# Patient Record
Sex: MALE | Race: WHITE | NOT HISPANIC OR LATINO | Employment: FULL TIME | URBAN - NONMETROPOLITAN AREA
[De-identification: names, ages, dates, MRNs, and addresses within clinical notes are randomized per-mention and may not be internally consistent; named-entity substitution may affect disease eponyms.]

---

## 2017-06-07 ENCOUNTER — APPOINTMENT (OUTPATIENT)
Dept: LAB | Facility: HOSPITAL | Age: 57
End: 2017-06-07
Payer: COMMERCIAL

## 2017-06-07 ENCOUNTER — TRANSCRIBE ORDERS (OUTPATIENT)
Dept: ADMINISTRATIVE | Facility: HOSPITAL | Age: 57
End: 2017-06-07

## 2017-06-07 DIAGNOSIS — R73.9 HYPERGLYCEMIA: ICD-10-CM

## 2017-06-07 DIAGNOSIS — E78.5 HYPERLIPIDEMIA: ICD-10-CM

## 2017-06-07 DIAGNOSIS — I10 ESSENTIAL (PRIMARY) HYPERTENSION: ICD-10-CM

## 2017-06-07 LAB
ALBUMIN SERPL BCP-MCNC: 4.1 G/DL (ref 3.5–5)
ALP SERPL-CCNC: 41 U/L (ref 46–116)
ALT SERPL W P-5'-P-CCNC: 51 U/L (ref 12–78)
ANION GAP SERPL CALCULATED.3IONS-SCNC: 7 MMOL/L (ref 4–13)
AST SERPL W P-5'-P-CCNC: 22 U/L (ref 5–45)
BILIRUB SERPL-MCNC: 0.6 MG/DL (ref 0.2–1)
BUN SERPL-MCNC: 14 MG/DL (ref 5–25)
CALCIUM SERPL-MCNC: 9.1 MG/DL (ref 8.3–10.1)
CHLORIDE SERPL-SCNC: 103 MMOL/L (ref 100–108)
CHOLEST SERPL-MCNC: 217 MG/DL (ref 50–200)
CO2 SERPL-SCNC: 29 MMOL/L (ref 21–32)
CREAT SERPL-MCNC: 1.01 MG/DL (ref 0.6–1.3)
GFR SERPL CREATININE-BSD FRML MDRD: >60 ML/MIN/1.73SQ M
GLUCOSE P FAST SERPL-MCNC: 98 MG/DL (ref 65–99)
HDLC SERPL-MCNC: 50 MG/DL (ref 40–60)
LDLC SERPL DIRECT ASSAY-MCNC: 147 MG/DL (ref 0–100)
POTASSIUM SERPL-SCNC: 4.4 MMOL/L (ref 3.5–5.3)
PROT SERPL-MCNC: 6.8 G/DL (ref 6.4–8.2)
SODIUM SERPL-SCNC: 139 MMOL/L (ref 136–145)
TRIGL SERPL-MCNC: 173 MG/DL

## 2017-06-07 PROCEDURE — 36415 COLL VENOUS BLD VENIPUNCTURE: CPT

## 2017-06-07 PROCEDURE — 83721 ASSAY OF BLOOD LIPOPROTEIN: CPT

## 2017-06-07 PROCEDURE — 80053 COMPREHEN METABOLIC PANEL: CPT

## 2017-06-07 PROCEDURE — 80061 LIPID PANEL: CPT

## 2017-06-14 ENCOUNTER — ALLSCRIPTS OFFICE VISIT (OUTPATIENT)
Dept: OTHER | Facility: OTHER | Age: 57
End: 2017-06-14

## 2017-12-01 DIAGNOSIS — I10 ESSENTIAL (PRIMARY) HYPERTENSION: ICD-10-CM

## 2017-12-01 DIAGNOSIS — N40.0 ENLARGED PROSTATE WITHOUT LOWER URINARY TRACT SYMPTOMS (LUTS): ICD-10-CM

## 2017-12-01 DIAGNOSIS — R73.9 HYPERGLYCEMIA: ICD-10-CM

## 2017-12-01 DIAGNOSIS — Z12.5 ENCOUNTER FOR SCREENING FOR MALIGNANT NEOPLASM OF PROSTATE: ICD-10-CM

## 2017-12-01 DIAGNOSIS — E78.5 HYPERLIPIDEMIA: ICD-10-CM

## 2018-01-14 VITALS — HEART RATE: 72 BPM | SYSTOLIC BLOOD PRESSURE: 140 MMHG | DIASTOLIC BLOOD PRESSURE: 92 MMHG | RESPIRATION RATE: 14 BRPM

## 2018-01-17 ENCOUNTER — APPOINTMENT (OUTPATIENT)
Dept: LAB | Facility: CLINIC | Age: 58
End: 2018-01-17
Payer: COMMERCIAL

## 2018-01-17 ENCOUNTER — TRANSCRIBE ORDERS (OUTPATIENT)
Dept: LAB | Facility: CLINIC | Age: 58
End: 2018-01-17

## 2018-01-17 DIAGNOSIS — Z12.5 ENCOUNTER FOR SCREENING FOR MALIGNANT NEOPLASM OF PROSTATE: ICD-10-CM

## 2018-01-17 DIAGNOSIS — E78.5 HYPERLIPIDEMIA: ICD-10-CM

## 2018-01-17 DIAGNOSIS — R73.9 HYPERGLYCEMIA: ICD-10-CM

## 2018-01-17 DIAGNOSIS — I10 ESSENTIAL (PRIMARY) HYPERTENSION: ICD-10-CM

## 2018-01-17 DIAGNOSIS — N40.0 ENLARGED PROSTATE WITHOUT LOWER URINARY TRACT SYMPTOMS (LUTS): ICD-10-CM

## 2018-01-17 LAB
ALBUMIN SERPL BCP-MCNC: 4 G/DL (ref 3.5–5)
ALP SERPL-CCNC: 44 U/L (ref 46–116)
ALT SERPL W P-5'-P-CCNC: 44 U/L (ref 12–78)
ANION GAP SERPL CALCULATED.3IONS-SCNC: 6 MMOL/L (ref 4–13)
AST SERPL W P-5'-P-CCNC: 20 U/L (ref 5–45)
BASOPHILS # BLD AUTO: 0.04 THOUSANDS/ΜL (ref 0–0.1)
BASOPHILS NFR BLD AUTO: 1 % (ref 0–1)
BILIRUB SERPL-MCNC: 0.85 MG/DL (ref 0.2–1)
BUN SERPL-MCNC: 14 MG/DL (ref 5–25)
CALCIUM SERPL-MCNC: 8.9 MG/DL (ref 8.3–10.1)
CHLORIDE SERPL-SCNC: 106 MMOL/L (ref 100–108)
CHOLEST SERPL-MCNC: 160 MG/DL (ref 50–200)
CO2 SERPL-SCNC: 29 MMOL/L (ref 21–32)
CREAT SERPL-MCNC: 0.96 MG/DL (ref 0.6–1.3)
EOSINOPHIL # BLD AUTO: 0.13 THOUSAND/ΜL (ref 0–0.61)
EOSINOPHIL NFR BLD AUTO: 3 % (ref 0–6)
ERYTHROCYTE [DISTWIDTH] IN BLOOD BY AUTOMATED COUNT: 12.8 % (ref 11.6–15.1)
GFR SERPL CREATININE-BSD FRML MDRD: 86 ML/MIN/1.73SQ M
GLUCOSE P FAST SERPL-MCNC: 91 MG/DL (ref 65–99)
HCT VFR BLD AUTO: 42.9 % (ref 36.5–49.3)
HDLC SERPL-MCNC: 50 MG/DL (ref 40–60)
HGB BLD-MCNC: 15.2 G/DL (ref 12–17)
LDLC SERPL DIRECT ASSAY-MCNC: 101 MG/DL (ref 0–100)
LYMPHOCYTES # BLD AUTO: 1.68 THOUSANDS/ΜL (ref 0.6–4.47)
LYMPHOCYTES NFR BLD AUTO: 33 % (ref 14–44)
MCH RBC QN AUTO: 31.5 PG (ref 26.8–34.3)
MCHC RBC AUTO-ENTMCNC: 35.4 G/DL (ref 31.4–37.4)
MCV RBC AUTO: 89 FL (ref 82–98)
MONOCYTES # BLD AUTO: 0.54 THOUSAND/ΜL (ref 0.17–1.22)
MONOCYTES NFR BLD AUTO: 11 % (ref 4–12)
NEUTROPHILS # BLD AUTO: 2.68 THOUSANDS/ΜL (ref 1.85–7.62)
NEUTS SEG NFR BLD AUTO: 52 % (ref 43–75)
NRBC BLD AUTO-RTO: 0 /100 WBCS
PLATELET # BLD AUTO: 235 THOUSANDS/UL (ref 149–390)
PMV BLD AUTO: 9.8 FL (ref 8.9–12.7)
POTASSIUM SERPL-SCNC: 4.2 MMOL/L (ref 3.5–5.3)
PROT SERPL-MCNC: 7 G/DL (ref 6.4–8.2)
PSA SERPL-MCNC: 1.1 NG/ML (ref 0–4)
RBC # BLD AUTO: 4.83 MILLION/UL (ref 3.88–5.62)
SODIUM SERPL-SCNC: 141 MMOL/L (ref 136–145)
TRIGL SERPL-MCNC: 126 MG/DL
WBC # BLD AUTO: 5.08 THOUSAND/UL (ref 4.31–10.16)

## 2018-01-17 PROCEDURE — 83721 ASSAY OF BLOOD LIPOPROTEIN: CPT

## 2018-01-17 PROCEDURE — 85025 COMPLETE CBC W/AUTO DIFF WBC: CPT

## 2018-01-17 PROCEDURE — 80053 COMPREHEN METABOLIC PANEL: CPT

## 2018-01-17 PROCEDURE — G0103 PSA SCREENING: HCPCS

## 2018-01-17 PROCEDURE — 36415 COLL VENOUS BLD VENIPUNCTURE: CPT

## 2018-01-17 PROCEDURE — 80061 LIPID PANEL: CPT

## 2018-01-22 ENCOUNTER — GENERIC CONVERSION - ENCOUNTER (OUTPATIENT)
Dept: OTHER | Facility: OTHER | Age: 58
End: 2018-01-22

## 2018-01-22 DIAGNOSIS — I65.29 OCCLUSION AND STENOSIS OF UNSPECIFIED CAROTID ARTERY: ICD-10-CM

## 2018-03-08 ENCOUNTER — TRANSCRIBE ORDERS (OUTPATIENT)
Dept: ADMINISTRATIVE | Facility: HOSPITAL | Age: 58
End: 2018-03-08

## 2018-03-09 ENCOUNTER — HOSPITAL ENCOUNTER (OUTPATIENT)
Dept: ULTRASOUND IMAGING | Facility: HOSPITAL | Age: 58
Discharge: HOME | End: 2018-03-09
Payer: COMMERCIAL

## 2018-03-09 ENCOUNTER — HOSPITAL ENCOUNTER (OUTPATIENT)
Dept: NON INVASIVE DIAGNOSTICS | Facility: HOSPITAL | Age: 58
Discharge: HOME | End: 2018-03-09
Payer: COMMERCIAL

## 2018-03-09 DIAGNOSIS — I65.29 OCCLUSION AND STENOSIS OF UNSPECIFIED CAROTID ARTERY: ICD-10-CM

## 2018-03-09 PROCEDURE — 93016 CV STRESS TEST SUPVJ ONLY: CPT | Performed by: INTERNAL MEDICINE

## 2018-03-09 PROCEDURE — 93880 EXTRACRANIAL BILAT STUDY: CPT | Performed by: SURGERY

## 2018-03-09 PROCEDURE — 93018 CV STRESS TEST I&R ONLY: CPT | Performed by: INTERNAL MEDICINE

## 2018-03-09 PROCEDURE — 93017 CV STRESS TEST TRACING ONLY: CPT

## 2018-03-09 PROCEDURE — 93880 EXTRACRANIAL BILAT STUDY: CPT

## 2018-03-11 ENCOUNTER — DOCUMENTATION (OUTPATIENT)
Dept: INTERNAL MEDICINE CLINIC | Facility: CLINIC | Age: 58
End: 2018-03-11

## 2018-03-11 NOTE — PROGRESS NOTES
Please call the patient regarding his testing-stress test normal   Carotid Doppler shows only mild hardening of the arteries-unchanged from his prior carotid Doppler-[facility]

## 2018-03-11 NOTE — PROGRESS NOTES
Stress test normal   Carotid Doppler shows less than 50% stenosis bilaterally-unchanged from prior carotid Doppler-rachael chart next visit patient had stress test and carotid Doppler in March of 2018

## 2018-03-12 ENCOUNTER — TELEPHONE (OUTPATIENT)
Dept: INTERNAL MEDICINE CLINIC | Facility: CLINIC | Age: 58
End: 2018-03-12

## 2018-03-12 NOTE — TELEPHONE ENCOUNTER
PER , I LEFT 'S NAME AND PHONE NUMBER ON HIS MACHINE SO THAT HE CAN CALL TO SCHEDULE APPT   I TOLD PT HE CAN CALL US IF HE HAS ANY QUESTIONS OR TROUBLE MAKING THE APPT

## 2018-03-12 NOTE — TELEPHONE ENCOUNTER
----- Message from Monica Lipscomb MD sent at 3/11/2018  8:46 AM EDT -----  Please call the patient regarding his testing-stress test normal   Carotid Doppler shows only mild hardening of the arteries-unchanged from his prior carotid Doppler-[facility]

## 2018-03-13 LAB
CHEST PAIN STATEMENT: NORMAL
MAX DIASTOLIC BP: 84 MMHG
MAX HEART RATE: 169 BPM
MAX PREDICTED HEART RATE: 160 BPM
MAX. SYSTOLIC BP: 170 MMHG
PROTOCOL NAME: NORMAL
REASON FOR TERMINATION: NORMAL
TARGET HR FORMULA: NORMAL
TEST INDICATION: NORMAL
TIME IN EXERCISE PHASE: NORMAL

## 2018-07-01 DIAGNOSIS — I10 ESSENTIAL (PRIMARY) HYPERTENSION: ICD-10-CM

## 2018-07-01 DIAGNOSIS — E78.5 HYPERLIPIDEMIA: ICD-10-CM

## 2018-07-20 ENCOUNTER — APPOINTMENT (OUTPATIENT)
Dept: LAB | Facility: HOSPITAL | Age: 58
End: 2018-07-20
Payer: COMMERCIAL

## 2018-07-20 DIAGNOSIS — I10 ESSENTIAL (PRIMARY) HYPERTENSION: ICD-10-CM

## 2018-07-20 DIAGNOSIS — E78.5 HYPERLIPIDEMIA: ICD-10-CM

## 2018-07-20 LAB
ALBUMIN SERPL BCP-MCNC: 4.1 G/DL (ref 3.5–5)
ALP SERPL-CCNC: 54 U/L (ref 46–116)
ALT SERPL W P-5'-P-CCNC: 58 U/L (ref 12–78)
ANION GAP SERPL CALCULATED.3IONS-SCNC: 9 MMOL/L (ref 4–13)
AST SERPL W P-5'-P-CCNC: 37 U/L (ref 5–45)
BILIRUB SERPL-MCNC: 1 MG/DL (ref 0.2–1)
BUN SERPL-MCNC: 17 MG/DL (ref 5–25)
CALCIUM SERPL-MCNC: 9.1 MG/DL (ref 8.3–10.1)
CHLORIDE SERPL-SCNC: 103 MMOL/L (ref 100–108)
CHOLEST SERPL-MCNC: 160 MG/DL (ref 50–200)
CO2 SERPL-SCNC: 26 MMOL/L (ref 21–32)
CREAT SERPL-MCNC: 0.97 MG/DL (ref 0.6–1.3)
GFR SERPL CREATININE-BSD FRML MDRD: 84 ML/MIN/1.73SQ M
GLUCOSE P FAST SERPL-MCNC: 94 MG/DL (ref 65–99)
HDLC SERPL-MCNC: 47 MG/DL (ref 40–60)
LDLC SERPL DIRECT ASSAY-MCNC: 97 MG/DL (ref 0–100)
POTASSIUM SERPL-SCNC: 4 MMOL/L (ref 3.5–5.3)
PROT SERPL-MCNC: 6.8 G/DL (ref 6.4–8.2)
SODIUM SERPL-SCNC: 138 MMOL/L (ref 136–145)
TRIGL SERPL-MCNC: 135 MG/DL

## 2018-07-20 PROCEDURE — 83721 ASSAY OF BLOOD LIPOPROTEIN: CPT

## 2018-07-20 PROCEDURE — 80053 COMPREHEN METABOLIC PANEL: CPT

## 2018-07-20 PROCEDURE — 80061 LIPID PANEL: CPT

## 2018-07-20 PROCEDURE — 36415 COLL VENOUS BLD VENIPUNCTURE: CPT

## 2018-07-24 RX ORDER — ROSUVASTATIN CALCIUM 5 MG/1
1 TABLET, COATED ORAL DAILY
COMMUNITY
Start: 2017-06-14 | End: 2018-07-25 | Stop reason: SDUPTHER

## 2018-07-24 RX ORDER — SODIUM PICOSULFATE, MAGNESIUM OXIDE, AND ANHYDROUS CITRIC ACID 10; 3.5; 12 MG/160ML; G/160ML; G/160ML
LIQUID ORAL
COMMUNITY
Start: 2018-06-02 | End: 2019-01-31 | Stop reason: ALTCHOICE

## 2018-07-24 RX ORDER — CHLORAL HYDRATE 500 MG
1 CAPSULE ORAL 2 TIMES DAILY
COMMUNITY

## 2018-07-24 RX ORDER — ACETAMINOPHEN AND CODEINE PHOSPHATE 300; 30 MG/1; MG/1
TABLET ORAL
COMMUNITY
Start: 2014-08-12 | End: 2022-04-27

## 2018-07-24 RX ORDER — SIMVASTATIN 20 MG
1 TABLET ORAL DAILY
COMMUNITY
Start: 2014-03-26 | End: 2019-02-01

## 2018-07-24 RX ORDER — ENALAPRIL MALEATE 10 MG/1
1 TABLET ORAL 2 TIMES DAILY
COMMUNITY
Start: 2015-05-11 | End: 2018-07-25 | Stop reason: SDUPTHER

## 2018-07-24 RX ORDER — AMLODIPINE BESYLATE 5 MG/1
1 TABLET ORAL DAILY
COMMUNITY
Start: 2017-06-14 | End: 2018-07-25 | Stop reason: SDUPTHER

## 2018-07-25 ENCOUNTER — OFFICE VISIT (OUTPATIENT)
Dept: INTERNAL MEDICINE CLINIC | Facility: CLINIC | Age: 58
End: 2018-07-25
Payer: COMMERCIAL

## 2018-07-25 VITALS
DIASTOLIC BLOOD PRESSURE: 78 MMHG | SYSTOLIC BLOOD PRESSURE: 120 MMHG | BODY MASS INDEX: 29.07 KG/M2 | HEART RATE: 72 BPM | WEIGHT: 202.6 LBS | RESPIRATION RATE: 14 BRPM

## 2018-07-25 DIAGNOSIS — E78.5 HYPERLIPIDEMIA, UNSPECIFIED HYPERLIPIDEMIA TYPE: ICD-10-CM

## 2018-07-25 DIAGNOSIS — I10 HYPERTENSION, UNSPECIFIED TYPE: ICD-10-CM

## 2018-07-25 DIAGNOSIS — R97.20 ELEVATED PSA: Primary | ICD-10-CM

## 2018-07-25 DIAGNOSIS — R97.20 ELEVATED PSA: ICD-10-CM

## 2018-07-25 PROCEDURE — 3074F SYST BP LT 130 MM HG: CPT | Performed by: INTERNAL MEDICINE

## 2018-07-25 PROCEDURE — 3078F DIAST BP <80 MM HG: CPT | Performed by: INTERNAL MEDICINE

## 2018-07-25 PROCEDURE — 99214 OFFICE O/P EST MOD 30 MIN: CPT | Performed by: INTERNAL MEDICINE

## 2018-07-25 RX ORDER — ROSUVASTATIN CALCIUM 5 MG/1
5 TABLET, COATED ORAL DAILY
Qty: 90 TABLET | Refills: 3 | Status: SHIPPED | OUTPATIENT
Start: 2018-07-25 | End: 2019-09-23 | Stop reason: SDUPTHER

## 2018-07-25 RX ORDER — AMLODIPINE BESYLATE 5 MG/1
5 TABLET ORAL DAILY
Qty: 90 TABLET | Refills: 3 | Status: SHIPPED | OUTPATIENT
Start: 2018-07-25 | End: 2019-09-23 | Stop reason: SDUPTHER

## 2018-07-25 RX ORDER — ENALAPRIL MALEATE 10 MG/1
10 TABLET ORAL 2 TIMES DAILY
Qty: 180 TABLET | Refills: 3 | Status: SHIPPED | OUTPATIENT
Start: 2018-07-25 | End: 2019-09-23 | Stop reason: SDUPTHER

## 2018-07-25 NOTE — PROGRESS NOTES
Assessment/Plan:  1  Subclinical carotid stenosis-less than 50% bilaterally-carotid Doppler unchanged over 6 year span and last done in the year 2018  2  Hypertension-adequate control 1 amlodipine plus now pro  3  Elevated PSA-previously running around 1 2-then abruptly climbed to 3 5  Repeated since had a normal in several occasions  Most recent 1 1  For repeat prior to next visit  4  Strong family history coronary artery disease-recent stress test normal  5  History difficulty swallowing-rule out Schatzki's ring-not an issue for months  6  Hyperlipidemia-elevated liver enzymes with Lipitor  Pravastatin affective  Without adequate control on simvastatin  Much improved on generic Crestor      MEDICAL REGIMEN:      Crestor 5 milligrams daily, 2 grams of fish oil daily, baby aspirin daily, enalapril 10 milligrams b i d  amlodipine 5 milligrams daily    Appointment in 6 months with prior chemistry profile, cholesterol profile  No problem-specific Assessment & Plan notes found for this encounter  Diagnoses and all orders for this visit:    Elevated PSA  -     acetaminophen-codeine (TYLENOL with CODEINE #3) 300-30 MG per tablet; Take by mouth  -     amLODIPine (NORVASC) 5 mg tablet; Take 1 tablet by mouth daily  -     aspirin 81 MG tablet; Take by mouth  -     enalapril (VASOTEC) 10 mg tablet; Take 1 tablet by mouth 2 (two) times a day  -     Omega-3 Fatty Acids (FISH OIL) 1,000 mg; Take 1 capsule by mouth daily  -     rosuvastatin (CRESTOR) 5 mg tablet; Take 1 tablet by mouth daily  -     simvastatin (ZOCOR) 20 mg tablet; Take 1 tablet by mouth daily  -     CLENPIQ 10-3 5-12 MG-GM -GM/160ML SOLN;   -     Comprehensive metabolic panel; Future  -     Cholesterol, total; Future  -     Triglycerides; Future  -     HDL cholesterol; Future  -     LDL cholesterol, direct; Future  -     PSA Total, Diagnostic;  Future    Hyperlipidemia, unspecified hyperlipidemia type  -     acetaminophen-codeine (TYLENOL with CODEINE #3) 300-30 MG per tablet; Take by mouth  -     amLODIPine (NORVASC) 5 mg tablet; Take 1 tablet by mouth daily  -     aspirin 81 MG tablet; Take by mouth  -     enalapril (VASOTEC) 10 mg tablet; Take 1 tablet by mouth 2 (two) times a day  -     Omega-3 Fatty Acids (FISH OIL) 1,000 mg; Take 1 capsule by mouth daily  -     rosuvastatin (CRESTOR) 5 mg tablet; Take 1 tablet by mouth daily  -     simvastatin (ZOCOR) 20 mg tablet; Take 1 tablet by mouth daily  -     CLENPIQ 10-3 5-12 MG-GM -GM/160ML SOLN;   -     Comprehensive metabolic panel; Future  -     Cholesterol, total; Future  -     Triglycerides; Future  -     HDL cholesterol; Future  -     LDL cholesterol, direct; Future  -     PSA Total, Diagnostic; Future    Hypertension, unspecified type  -     acetaminophen-codeine (TYLENOL with CODEINE #3) 300-30 MG per tablet; Take by mouth  -     amLODIPine (NORVASC) 5 mg tablet; Take 1 tablet by mouth daily  -     aspirin 81 MG tablet; Take by mouth  -     enalapril (VASOTEC) 10 mg tablet; Take 1 tablet by mouth 2 (two) times a day  -     Omega-3 Fatty Acids (FISH OIL) 1,000 mg; Take 1 capsule by mouth daily  -     rosuvastatin (CRESTOR) 5 mg tablet; Take 1 tablet by mouth daily  -     simvastatin (ZOCOR) 20 mg tablet; Take 1 tablet by mouth daily  -     CLENPIQ 10-3 5-12 MG-GM -GM/160ML SOLN;   -     Comprehensive metabolic panel; Future  -     Cholesterol, total; Future  -     Triglycerides; Future  -     HDL cholesterol; Future  -     LDL cholesterol, direct; Future  -     PSA Total, Diagnostic; Future          Subjective:      Patient ID: Karsten Glez is a 59 y o  male  He is overall relatively stable  Laboratory testing done prior to this visit shows LDL 97 HDL 47 triglycerides 135 cholesterol 160 chemistry profile normal other than a creatinine of 0 97  He has known hypertension  BP adequately controlled on current regimen with goal BP under 130/80  Avoiding salt and decongestants    Denies hematemesis pounding of his heart sweats and headache  Remains on a statin  Understands the difference between status associated myalgias and arthralgias from degenerative arthritis  He underwent vascular screen does last visit  Exercise stress test normal   Carotid Doppler shows mild disease bilaterally specifically less than 50%  Plaque was heterogeneous bilaterally  It was unchanged from the year 2013  We reviewed contributing risk factors to vascular disease including hypertension and hyperlipidemia  He is not a smoker and does not have diabetes  This patient denies any systemic symptoms  Specifically there has been no evidence of fever, night sweats, significant weight loss or significant decrease in appetite  He avoids excess nonsteroidals  He works long hours -50 hours per week  He denies any weakness of 1 arm and leg compared to the other  Denies any numbness of 1 arm and leg compared to the other  He also completed a colonoscopy which was done showing a normal study with repeat recommended in 10 years        The following portions of the patient's history were reviewed and updated as appropriate: current medications, past family history, past medical history, past social history, past surgical history and problem list     Review of Systems   Constitutional: Negative  Respiratory: Negative  Cardiovascular: Negative  Gastrointestinal: Negative  Endocrine: Negative  Genitourinary: Negative  Musculoskeletal: Negative  Neurological: Negative  Hematological: Negative  Psychiatric/Behavioral: Negative  Objective:      /78   Pulse 72   Resp 14   Wt 91 9 kg (202 lb 9 6 oz)   BMI 29 07 kg/m²          Physical Exam   Constitutional: He is oriented to person, place, and time  He appears well-developed and well-nourished  No distress  HENT:   Head: Normocephalic and atraumatic     Right Ear: External ear normal    Left Ear: External ear normal  Nose: Nose normal    Mouth/Throat: Oropharynx is clear and moist  No oropharyngeal exudate  Eyes: Conjunctivae and EOM are normal  Pupils are equal, round, and reactive to light  Right eye exhibits no discharge  Left eye exhibits no discharge  No scleral icterus  Neck: No JVD present  No tracheal deviation present  No thyromegaly present  Cardiovascular: Normal rate, regular rhythm, normal heart sounds and intact distal pulses  Exam reveals no gallop and no friction rub  No murmur heard  Pulmonary/Chest: Effort normal and breath sounds normal  No stridor  No respiratory distress  He has no wheezes  He exhibits no tenderness  Abdominal: Bowel sounds are normal  He exhibits no distension and no mass  There is no tenderness  There is no rebound and no guarding  Genitourinary: Prostate normal  Rectal exam shows guaiac negative stool  Musculoskeletal: Normal range of motion  He exhibits no edema, tenderness or deformity  Lymphadenopathy:     He has no cervical adenopathy  Neurological: He is alert and oriented to person, place, and time  He has normal reflexes  No cranial nerve deficit  He exhibits normal muscle tone  Coordination normal    Skin: Skin is warm and dry  No rash noted  He is not diaphoretic  No erythema  No pallor  Psychiatric: He has a normal mood and affect   His behavior is normal  Judgment and thought content normal

## 2019-01-17 ENCOUNTER — APPOINTMENT (OUTPATIENT)
Dept: LAB | Facility: HOSPITAL | Age: 59
End: 2019-01-17
Payer: COMMERCIAL

## 2019-01-17 DIAGNOSIS — E78.5 HYPERLIPIDEMIA, UNSPECIFIED HYPERLIPIDEMIA TYPE: ICD-10-CM

## 2019-01-17 DIAGNOSIS — I10 HYPERTENSION, UNSPECIFIED TYPE: ICD-10-CM

## 2019-01-17 DIAGNOSIS — R97.20 ELEVATED PSA: ICD-10-CM

## 2019-01-17 LAB
ALBUMIN SERPL BCP-MCNC: 4.1 G/DL (ref 3.5–5)
ALP SERPL-CCNC: 52 U/L (ref 46–116)
ALT SERPL W P-5'-P-CCNC: 46 U/L (ref 12–78)
ANION GAP SERPL CALCULATED.3IONS-SCNC: 8 MMOL/L (ref 4–13)
AST SERPL W P-5'-P-CCNC: 20 U/L (ref 5–45)
BILIRUB SERPL-MCNC: 0.8 MG/DL (ref 0.2–1)
BUN SERPL-MCNC: 15 MG/DL (ref 5–25)
CALCIUM SERPL-MCNC: 9.3 MG/DL (ref 8.3–10.1)
CHLORIDE SERPL-SCNC: 103 MMOL/L (ref 100–108)
CHOLEST SERPL-MCNC: 179 MG/DL (ref 50–200)
CO2 SERPL-SCNC: 29 MMOL/L (ref 21–32)
CREAT SERPL-MCNC: 1.07 MG/DL (ref 0.6–1.3)
GFR SERPL CREATININE-BSD FRML MDRD: 75 ML/MIN/1.73SQ M
GLUCOSE P FAST SERPL-MCNC: 98 MG/DL (ref 65–99)
HDLC SERPL-MCNC: 51 MG/DL (ref 40–60)
LDLC SERPL DIRECT ASSAY-MCNC: 113 MG/DL (ref 0–100)
POTASSIUM SERPL-SCNC: 4.4 MMOL/L (ref 3.5–5.3)
PROT SERPL-MCNC: 7.2 G/DL (ref 6.4–8.2)
PSA SERPL-MCNC: 6.5 NG/ML (ref 0–4)
SODIUM SERPL-SCNC: 140 MMOL/L (ref 136–145)
TRIGL SERPL-MCNC: 144 MG/DL

## 2019-01-17 PROCEDURE — 83721 ASSAY OF BLOOD LIPOPROTEIN: CPT

## 2019-01-17 PROCEDURE — 80053 COMPREHEN METABOLIC PANEL: CPT

## 2019-01-17 PROCEDURE — 80061 LIPID PANEL: CPT

## 2019-01-17 PROCEDURE — 84153 ASSAY OF PSA TOTAL: CPT

## 2019-01-17 PROCEDURE — 36415 COLL VENOUS BLD VENIPUNCTURE: CPT

## 2019-01-23 ENCOUNTER — OFFICE VISIT (OUTPATIENT)
Dept: INTERNAL MEDICINE CLINIC | Facility: CLINIC | Age: 59
End: 2019-01-23
Payer: COMMERCIAL

## 2019-01-23 VITALS
HEIGHT: 70 IN | WEIGHT: 200.6 LBS | SYSTOLIC BLOOD PRESSURE: 130 MMHG | HEART RATE: 72 BPM | BODY MASS INDEX: 28.72 KG/M2 | DIASTOLIC BLOOD PRESSURE: 84 MMHG | RESPIRATION RATE: 14 BRPM

## 2019-01-23 DIAGNOSIS — E78.5 HYPERLIPIDEMIA, UNSPECIFIED HYPERLIPIDEMIA TYPE: Primary | ICD-10-CM

## 2019-01-23 DIAGNOSIS — E78.2 MIXED HYPERLIPIDEMIA: ICD-10-CM

## 2019-01-23 DIAGNOSIS — I10 HYPERTENSION, UNSPECIFIED TYPE: ICD-10-CM

## 2019-01-23 DIAGNOSIS — R97.20 ELEVATED PSA: ICD-10-CM

## 2019-01-23 DIAGNOSIS — R13.10 DYSPHAGIA, UNSPECIFIED TYPE: ICD-10-CM

## 2019-01-23 PROCEDURE — 99214 OFFICE O/P EST MOD 30 MIN: CPT | Performed by: INTERNAL MEDICINE

## 2019-01-23 NOTE — PROGRESS NOTES
Assessment/Plan:  1  Elevated PSA-previously running around 1-2 then abruptly climbed to 3 5-repeated since the normal in several occasions  Over the last year was 1 1  Now climbed to 6 5  Exam shows enlarged gland  Rule out prostate CA-referred to Urology  2  Solid food dysphagia-rule out Schatzki's ring-referred to GI for EGD  3  Subclinical carotid stenosis-carotid Doppler unchanged over 6 year span last year done in the year 2018  4  Hypertension-adequate control on amlodipine at current dose of ACE-inhibitor  5  Strong family history CAD-recent stress test normal  6  Hyperlipidemia-had elevated liver enzymes with Lipitor  Pravastatin ineffective  Without adequate control on simvastatin  Much improved on generic Crestor      MEDICAL REGIMEN:      Crestor 5 milligrams daily, fish oil 2 grams daily, baby aspirin daily, enalapril 10 milligrams b i d , amlodipine 5 milligrams daily    Scheduled for appointment with GI and Urology  Appointment in 6 months with prior chemistry profile, cholesterol profile  No problem-specific Assessment & Plan notes found for this encounter  Diagnoses and all orders for this visit:    Hyperlipidemia, unspecified hyperlipidemia type  -     Comprehensive metabolic panel; Future  -     Cholesterol, total; Future  -     HDL cholesterol; Future  -     LDL cholesterol, direct; Future  -     Triglycerides; Future    Hypertension, unspecified type  -     Comprehensive metabolic panel; Future  -     Cholesterol, total; Future  -     HDL cholesterol; Future  -     LDL cholesterol, direct; Future  -     Triglycerides; Future    Elevated PSA    Dysphagia, unspecified type    Mixed hyperlipidemia          Subjective:      Patient ID: Radha Abraham is a 59 y o  male  2 new issues  He talked about solid food dysphagia  This is becoming more of an issue  He denies liquid food dysphagia  He denies frequent reflux with only had 1 episode over the last few weeks    This patient denies any systemic symptoms  Specifically there has been no evidence of fever, night sweats, significant weight loss or significant decrease in appetite  This was an issue in the past but had improved  I recommended repeat evaluation by GI and he will likely need an endoscopy  He also has an elevated PSA  This it BP elevated in the past to 3 5  Then had normalized  Was 1 1 previously  Now climbed to 6 5  Referral made to Urology  He denies nocturia  He denies slowing of his stream   He denies any hematuria  He has known hypertension  Avoiding salt and decongestants  Denies hematemesis pounding of heart sweats and headache  He remains on a statin  He understands the difference between status associated myalgias and arthralgias from degenerative arthritis  Other labs done prior to this visit show an  HDL 51 triglycerides 144 Chem profile normal with a creatinine 1 07            The following portions of the patient's history were reviewed and updated as appropriate: allergies, current medications, past family history, past medical history, past social history, past surgical history and problem list     Review of Systems   Constitutional: Negative  Respiratory: Negative  Cardiovascular: Negative  Gastrointestinal: Negative  Solid food dysphagia   Endocrine: Negative  Genitourinary: Negative  Musculoskeletal: Negative  Neurological: Negative  Hematological: Negative  Psychiatric/Behavioral: Negative  Objective:      Ht 5' 10" (1 778 m)   Wt 91 kg (200 lb 9 6 oz)   BMI 28 78 kg/m²          Physical Exam   Constitutional: He is oriented to person, place, and time  He appears well-developed and well-nourished  No distress  HENT:   Head: Normocephalic and atraumatic  Right Ear: External ear normal    Left Ear: External ear normal    Nose: Nose normal    Mouth/Throat: Oropharynx is clear and moist  No oropharyngeal exudate     Eyes: Pupils are equal, round, and reactive to light  Conjunctivae and EOM are normal  Right eye exhibits no discharge  Left eye exhibits no discharge  No scleral icterus  Neck: No JVD present  No tracheal deviation present  No thyromegaly present  Cardiovascular: Normal rate, regular rhythm, normal heart sounds and intact distal pulses  Exam reveals no gallop and no friction rub  No murmur heard  Pulmonary/Chest: Effort normal and breath sounds normal  No stridor  No respiratory distress  He has no wheezes  He exhibits no tenderness  Abdominal: Bowel sounds are normal  He exhibits no distension and no mass  There is no tenderness  There is no rebound and no guarding  Genitourinary: Rectum normal and penis normal  Rectal exam shows guaiac negative stool  Genitourinary Comments: Enlarged prostate without nodules   Musculoskeletal: Normal range of motion  He exhibits no edema, tenderness or deformity  Lymphadenopathy:     He has no cervical adenopathy  Neurological: He is alert and oriented to person, place, and time  He has normal reflexes  No cranial nerve deficit  He exhibits normal muscle tone  Coordination normal    Skin: Skin is warm and dry  No rash noted  He is not diaphoretic  No erythema  No pallor  Psychiatric: He has a normal mood and affect   His behavior is normal  Judgment and thought content normal

## 2019-01-24 DIAGNOSIS — R13.10 DYSPHAGIA, UNSPECIFIED TYPE: ICD-10-CM

## 2019-01-24 DIAGNOSIS — R97.20 ELEVATED PSA: Primary | ICD-10-CM

## 2019-01-24 NOTE — PROGRESS NOTES
PER DR, PT NEEDS TO SEE BOTH DR Katty Olea AND A PHYSICIAN AT UROLOGY    PT IS AWARE THAT HE'LL HEAR FROM BOTH OFFICES AND IF HE DOESN'T WITHIN THE NEXT SEVERAL DAYS, HE SHOULD CALL US TO LET US KNOW SO WE CAN CALL THEIR OFFICES

## 2019-01-31 ENCOUNTER — OFFICE VISIT (OUTPATIENT)
Dept: UROLOGY | Facility: CLINIC | Age: 59
End: 2019-01-31
Payer: COMMERCIAL

## 2019-01-31 VITALS
HEIGHT: 70 IN | BODY MASS INDEX: 28.49 KG/M2 | RESPIRATION RATE: 20 BRPM | DIASTOLIC BLOOD PRESSURE: 90 MMHG | WEIGHT: 199 LBS | HEART RATE: 72 BPM | SYSTOLIC BLOOD PRESSURE: 160 MMHG

## 2019-01-31 DIAGNOSIS — R97.20 ELEVATED PSA: ICD-10-CM

## 2019-01-31 PROCEDURE — 99244 OFF/OP CNSLTJ NEW/EST MOD 40: CPT | Performed by: UROLOGY

## 2019-01-31 NOTE — PROGRESS NOTES
UROLOGY NEW CONSULT NOTE     CHIEF COMPLAINT   Marian Tejeda is a 59 y o  male with a complaint of   Chief Complaint   Patient presents with    Elevated PSA       History of Present Illness:     59 y o  male   Who has undergone recent PSA testing that was elevated from prior baseline  Has had routine digital rectal exams that were normal   Denies prostate cancer history in the family  Presents to discuss  Somewhat anxious about the elevated PSA  Lab Results   Component Value Date    PSA 6 5 (H) 01/17/2019    PSA 1 1 01/17/2018    PSA 1 2 12/09/2016       Past Medical History:     Past Medical History:   Diagnosis Date    Elevated PSA     Hyperlipidemia     Hypertension        PAST SURGICAL HISTORY:   History reviewed  No pertinent surgical history  CURRENT MEDICATIONS:     Current Outpatient Prescriptions   Medication Sig Dispense Refill    amLODIPine (NORVASC) 5 mg tablet Take 1 tablet (5 mg total) by mouth daily 90 tablet 3    aspirin 81 MG tablet Take by mouth      enalapril (VASOTEC) 10 mg tablet Take 1 tablet (10 mg total) by mouth 2 (two) times a day 180 tablet 3    Omega-3 Fatty Acids (FISH OIL) 1,000 mg Take 1 capsule by mouth daily      rosuvastatin (CRESTOR) 5 mg tablet Take 1 tablet (5 mg total) by mouth daily 90 tablet 3    acetaminophen-codeine (TYLENOL with CODEINE #3) 300-30 MG per tablet Take by mouth      simvastatin (ZOCOR) 20 mg tablet Take 1 tablet by mouth daily       No current facility-administered medications for this visit          ALLERGIES:   No Known Allergies    SOCIAL HISTORY:     Social History     Social History    Marital status: /Civil Union     Spouse name: N/A    Number of children: N/A    Years of education: N/A     Social History Main Topics    Smoking status: Never Smoker    Smokeless tobacco: Never Used    Alcohol use Yes      Comment: SOCIAL     Drug use: No    Sexual activity: Not Asked     Other Topics Concern    None     Social History Narrative    None       SOCIAL HISTORY:     Family History   Problem Relation Age of Onset    Hypertension Mother     Heart disease Father         CARDIAC DISORDER    Hypertension Father     Coronary artery disease Family     Hyperlipidemia Family     Osteoarthritis Family     Peripheral vascular disease Family        REVIEW OF SYSTEMS:     Review of Systems   Constitutional: Negative  Respiratory: Negative  Cardiovascular: Negative  Gastrointestinal: Negative  Genitourinary: Negative  Negative for dysuria, frequency and urgency  Musculoskeletal: Negative  Skin: Negative  Psychiatric/Behavioral: Negative  PHYSICAL EXAM:     /90   Pulse 72   Resp 20   Ht 5' 10" (1 778 m)   Wt 90 3 kg (199 lb)   BMI 28 55 kg/m²     General:  Healthy appearing male in no acute distress  They have a normal affect  There is not appear to be any gross neurologic defects or abnormalities  HEENT:  Normocephalic, atraumatic  Neck is supple without any palpable lymphadenopathy  Cardiovascular:  Patient has normal palpable distal radial pulses  There is no significant peripheral edema  No JVD is noted  Respiratory:  Patient has unlabored respirations  There is no audible wheeze or rhonchi  Abdomen:  Abdomen is   Without surgical scars  Abdomen is soft and nontender  There is no tympany  Inguinal and umbilical hernia are not appreciated  Genitourinary: no penile lesions or discharge, no testicular masses or tenderness, no hernias,  Digital rectal exam is small smooth without induration or nodules    Musculoskeletal:  Patient does not have significant CVA tenderness in the  flank with palpation or percussion  They full range of motion in all 4 extremities  Strength in all 4 extremities appears congruent  Patient is able to ambulate without assistance or difficulty  Dermatologic:  Patient has no skin abnormalities or rashes        LABS:     CBC:   Lab Results   Component Value Date    WBC 5 08 01/17/2018    HGB 15 2 01/17/2018    HCT 42 9 01/17/2018    MCV 89 01/17/2018     01/17/2018       BMP:   Lab Results   Component Value Date    GLUCOSE 95 12/02/2015    CALCIUM 9 3 01/17/2019     12/02/2015    K 4 4 01/17/2019    CO2 29 01/17/2019     01/17/2019    BUN 15 01/17/2019    CREATININE 1 07 01/17/2019     Lab Results   Component Value Date    PSA 6 5 (H) 01/17/2019    PSA 1 1 01/17/2018    PSA 1 2 12/09/2016       ASSESSMENT:     59 y o  male with  Elevated PSA    PLAN:     I gave the patient a general overview surrounding prostate health  We discussed the gland's anatomy and function  We discussed that PSA is protein made by the prostate gland in normal healthy males  When screening for prostate cancer, we evaluate man at high risk for prostate cancer or those men within a predefined age range who have a life expectancy greater than ten years  These screening guidelines were set forth by our colleagues at the [address] [place] [address] in an effort to help find early, treatable cancers but also to minimize worry and harm caused by over-screening and over-treatment  Screening is performed by both examining the prostate via a digital rectal exam and by checking a PSA in routine bloodwork  Should there PSA be elevated outside of an acceptable range or if the patient is found to have abnormalities on digital rectal exam, a careful discussion needs to be held about proceeding to the next step in management and obtaining tissue through prostate biopsy for evaluation for cancer  We discussed that PSA is an imperfect screening tool and there are other reasons at the PSA can be elevated including but not limited to urinary infection, section transmitted infection, benign prostatic enlargement, urinary stones, trauma, and recent sexual activity  Patient has an elevated PSA  His digital rectal exam is notabnormal today    I have recommended that we repeat the patient's PSA in 2 weeks  Patient understands that we need to wait approximately 2 weeks to allow for the effects of today's digital rectal exam to subside  Patient understands they have to avoid sexual intercourse and ejaculation for approximately 3 days prior to the next lab test     If the patient's PSA returns to its normal baseline, we will continue follow-up and repeat his testing in 6 months  If the PSA remains elevated, we will have to seriously consider a discussion regarding tissue diagnosis with a prostate biopsy  I will plan to touch base with the patient once the PSA has returned

## 2019-01-31 NOTE — PATIENT INSTRUCTIONS
Decision Aid for Clinically Localized Prostate Cancer   AMBULATORY CARE:   What you need to know about decisions for clinically localized prostate cancer: You can work with your healthcare provider to make decisions about being screened or treated for prostate cancer  Screening is a test done to find prostate cancer early  Screening is different from diagnosis because screening is used before you first start to have signs or symptoms  This means management or treatment can start early  You can also help plan treatment if cancer is found with screening, or you develop it later on  What you need to know about clinically localized prostate cancer:   · The prostate is a small gland that is part of the reproductive system  It sits around your urethra (tube that carries urine out of your bladder)  Cancer cells start to grow inside the prostate gland  The cells form a mass that continues to grow if left untreated  Clinically localized means that the cancer has not moved beyond the prostate gland  · Prostate cancer is the second most common form of cancer in men (skin cancer is most common)  About 17% of men in the [place] develop prostate cancer  About 3% of men in the [place] die from prostate cancer  · Prostate cancer often grows slowly  Sometimes the tumor does not grow at all  The cancer may not grow quickly enough to be life-threatening in your lifetime  · The risk for prostate cancer increases with age  Your risk is highest if you are older than 65 years  Your risk is lowest if you are younger than 45 years  Your risk is also increased if you have a history of prostate cancer in your father, brother, or son  · You may have no signs or symptoms of prostate cancer until the tumor grows large  You may have trouble urinating or keeping a strong urine stream because of the tumor  At later stages, prostate cancer can spread to your bones or lymph nodes  You may have bone pain or fractures    How to know if you are a good candidate for prostate cancer screening:  Screening may be helpful for you if any of the following is true:  · You are 72 years or older  · You have a family history of prostate cancer or other prostate problems  · You do not have another health condition that may prevent you from living longer than another 10 years  · You want to have treatment as early as possible if needed  · You are willing to have screening as often as recommended by your healthcare provider  How screening is done:   · A digital rectal exam  is used to check your prostate  Your healthcare provider will insert a gloved finger into your rectum  The provider will be able to feel your prostate for lumps or hard areas that could be tumors  The exam may be repeated over time to check for new or worsening problems  · A PSA test  is used to measure the amount of a protein made by your prostate gland  A blood sample is taken for this test  A high PSA level may be a sign of prostate cancer  Benefits and risks of screening:  Talk with your healthcare provider about the risks and benefits of screening:  · Benefits  include finding prostate cancer early  Cancer treatment is often more successful when it starts early  This means you can make more decisions about treatment  · Risks  include the following:     ¨ You may have a false belief that you will not develop prostate cancer if your screening result is negative  You can still develop prostate cancer later on  ¨ A PSA test can give a false-negative result  This means the result looks like you do not have cancer even though you do  Treatment or monitoring may be delayed because the tests suggested you do not have cancer  ¨ The PSA test can also give a false-positive result  This means you do not actually have cancer but the test shows you do  You may get more tests, a biopsy, or even treatments that are not needed   It can also be stressful to think you have prostate cancer when you do not  Questions to ask your healthcare provider to help you make decisions about screening:   · How high is my risk for prostate cancer? · How often do I need to have screening? · Where is the screening done? · Do I need to do anything to get ready to have screening? What happens after you have screening:   · You will meet with your healthcare provider to go over the results of your screening  · You may need more tests to diagnose anything that showed up on the screening test  Only a biopsy (tissue sample) can show for sure that you have prostate cancer  · After cancer is found, your healthcare provider will assign a number called a Ashanti score  The number can help you understand how quickly the cancer is likely to grow, and if it may spread:     ¨ A number of 6 or lower means the cancer is likely to grow more slowly  ¨ A score of 7 means it is likely to grow faster, but it may not spread to other areas  ¨ A score of 8 to 10 means it is likely to grow more quickly and also spread  · Your healthcare provider will also assign a T stage to the tumor  This number shows the growth of the tumor and if it is likely to spread to other areas  · You and your healthcare provider will use the Ashanti score, T stage, and PSA results to talk about your treatment options  Your provider will tell you if your prostate cancer is at low, medium, or high risk for growing and spreading  The need for more tests and the range of treatment options depend on the risk level  Together you and your provider can create a treatment plan that is right for you  How clinically localized prostate cancer is treated, and the benefits of treatment:   · Watchful waiting  means you do not receive treatment right away  If the cancer does not grow or spread, you may never need treatment  Watchful waiting may be used if your tumor risk is low   The benefit of this option is that you will not have invasive or difficult treatment  You can choose a different treatment option if tests show the tumor is growing or spreading over time  · Active surveillance  also means you do not receive treatment right away  You will need to have tests over time to continue to check the tumor  A benefit of this option is that follow-up tests can show a change early  Treatment options may be less invasive than if the tumor is found at a later stage  · Cryoablation  is used to kill cancer cells by freezing them  This is a less invasive treatment  You may have little or no pain after this procedure  · Radiation  may be used to destroy the cancer cells  Radiation is about as effective as surgery to remove the prostate gland  This treatment leaves the prostate in place and only targets the cancer cells  · Surgery  is used to remove the prostate gland  The tumor is in the gland, so it will be removed along with the gland  · Hormone therapy  may be added to surgery or radiation treatment  Your testosterone level is lowered, or it is blocked  This can stop the cancer cells from growing, or slow the growth  Hormone therapy may be given as an injection every 1 to 6 months  Another way to lower your testosterone level is to have surgery to remove your testicles  Your body will no longer make testosterone if your testicles are removed  Risks of treatment:   · Watchful waiting and active surveillance  can cause you to worry that the cancer is growing or spreading  · Surgery  can damage tissue around your prostate  Surgery can increase your risk for trouble urinating, incontinence (leaking), or urinary retention  Surgery can also cause problems with your ability to have or keep an erection  You may bleed more than expected during surgery or develop an infection  You may also need to be treated again if the surgery you have does not relieve your symptoms  Surgery may not be able to remove all the tumor cells      · Radiation  may not kill all the cancer cells  Radiation can increase your risk for trouble urinating, incontinence (leaking), or urinary retention  You may have temporary or permanent hair loss in your scrotum  Your skin can become dry or irritated  You may develop problems urinating or having a bowel movement  Radiation can also cause problems with your ability to have or keep an erection  · Cryoablation  may not kill all the cancer cells  You may develop scar tissue  You can also have swelling, problems urinating, or pain in your pelvis or scrotum  Tissue outside the prostate may be damaged during cryoablation  You may have permanent erection problems  Questions to ask your healthcare provider to help you make decisions about treatment:   · What is my Rh[place] score, T score, and risk number? · How often will I need follow-up tests if I choose active surveillance first?    · How will each treatment option affect my daily activities? · What is the risk for erectile dysfunction or impotence with each treatment? · Am I a good candidate for surgery? · Which surgery may work best to treat my symptoms? · Where is the surgery done? · How long is recovery after surgery? · Will my insurance cover treatment? Questions to consider to help you make decisions about treatment:   · If my cancer risk is low, will I be comfortable with watchful waiting or active surveillance instead of immediate treatment? How will I feel if the cancer grows or spreads? · Will I go in for follow-up tests over time if I do not want treatment right away? · If I have treatment and lose my ability to have an erection, how will I feel? · Am I willing to accept problems with urinating or having a bowel movement that might happen with treatment? · How will my family or others in my life be affected by my treatment decisions?   © 2017 [employer] Information is for End User's use only and may not be sold, redistributed or otherwise used for commercial purposes  All illustrations and images included in CareNotes® are the copyrighted property of A D A M , Inc  or [employer]  The above information is an  only  It is not intended as medical advice for individual conditions or treatments  Talk to your doctor, nurse or pharmacist before following any medical regimen to see if it is safe and effective for you

## 2019-02-01 ENCOUNTER — APPOINTMENT (EMERGENCY)
Dept: ULTRASOUND IMAGING | Facility: HOSPITAL | Age: 59
DRG: 446 | End: 2019-02-01
Payer: COMMERCIAL

## 2019-02-01 ENCOUNTER — APPOINTMENT (EMERGENCY)
Dept: CT IMAGING | Facility: HOSPITAL | Age: 59
DRG: 446 | End: 2019-02-01
Payer: COMMERCIAL

## 2019-02-01 ENCOUNTER — HOSPITAL ENCOUNTER (INPATIENT)
Facility: HOSPITAL | Age: 59
LOS: 1 days | DRG: 446 | End: 2019-02-02
Attending: EMERGENCY MEDICINE | Admitting: SURGERY
Payer: COMMERCIAL

## 2019-02-01 DIAGNOSIS — K81.9 CHOLECYSTITIS: Primary | ICD-10-CM

## 2019-02-01 PROBLEM — K80.00 ACUTE CHOLECYSTITIS DUE TO BILIARY CALCULUS: Status: ACTIVE | Noted: 2019-02-01

## 2019-02-01 LAB
ALBUMIN SERPL BCP-MCNC: 4.7 G/DL (ref 3.5–5.7)
ALP SERPL-CCNC: 49 U/L (ref 55–165)
ALT SERPL W P-5'-P-CCNC: 29 U/L (ref 7–52)
ANION GAP SERPL CALCULATED.3IONS-SCNC: 10 MMOL/L (ref 4–13)
AST SERPL W P-5'-P-CCNC: 19 U/L (ref 13–39)
BASOPHILS # BLD AUTO: 0 THOUSANDS/ΜL (ref 0–0.1)
BASOPHILS NFR BLD AUTO: 0 % (ref 0–2)
BILIRUB SERPL-MCNC: 1.2 MG/DL (ref 0.2–1)
BUN SERPL-MCNC: 10 MG/DL (ref 7–25)
CALCIUM SERPL-MCNC: 9.7 MG/DL (ref 8.6–10.5)
CHLORIDE SERPL-SCNC: 101 MMOL/L (ref 98–107)
CO2 SERPL-SCNC: 24 MMOL/L (ref 21–31)
CREAT SERPL-MCNC: 1.01 MG/DL (ref 0.7–1.3)
EOSINOPHIL # BLD AUTO: 0 THOUSAND/ΜL (ref 0–0.61)
EOSINOPHIL NFR BLD AUTO: 0 % (ref 0–5)
ERYTHROCYTE [DISTWIDTH] IN BLOOD BY AUTOMATED COUNT: 13.2 % (ref 11.5–14.5)
GFR SERPL CREATININE-BSD FRML MDRD: 80 ML/MIN/1.73SQ M
GLUCOSE SERPL-MCNC: 120 MG/DL (ref 65–99)
HCT VFR BLD AUTO: 46.5 % (ref 36.5–49.3)
HGB BLD-MCNC: 15.8 G/DL (ref 14–18)
HOLD SPECIMEN: NORMAL
HOLD SPECIMEN: NORMAL
LIPASE SERPL-CCNC: 28 U/L (ref 11–82)
LYMPHOCYTES # BLD AUTO: 1.7 THOUSANDS/ΜL (ref 0.6–4.47)
LYMPHOCYTES NFR BLD AUTO: 13 % (ref 21–51)
MCH RBC QN AUTO: 30.6 PG (ref 26–34)
MCHC RBC AUTO-ENTMCNC: 34.1 G/DL (ref 31–37)
MCV RBC AUTO: 90 FL (ref 81–99)
MONOCYTES # BLD AUTO: 1 THOUSAND/ΜL (ref 0.17–1.22)
MONOCYTES NFR BLD AUTO: 8 % (ref 2–12)
NEUTROPHILS # BLD AUTO: 10 THOUSANDS/ΜL (ref 1.4–6.5)
NEUTS SEG NFR BLD AUTO: 78 % (ref 42–75)
NRBC BLD AUTO-RTO: 0 /100 WBCS
PLATELET # BLD AUTO: 300 THOUSANDS/UL (ref 149–390)
PMV BLD AUTO: 8.1 FL (ref 8.6–11.7)
POTASSIUM SERPL-SCNC: 4 MMOL/L (ref 3.5–5.5)
PROT SERPL-MCNC: 7.3 G/DL (ref 6.4–8.9)
RBC # BLD AUTO: 5.19 MILLION/UL (ref 4.3–5.9)
SODIUM SERPL-SCNC: 135 MMOL/L (ref 134–143)
TROPONIN I SERPL-MCNC: <0.03 NG/ML
WBC # BLD AUTO: 12.8 THOUSAND/UL (ref 4.8–10.8)

## 2019-02-01 PROCEDURE — C9113 INJ PANTOPRAZOLE SODIUM, VIA: HCPCS | Performed by: EMERGENCY MEDICINE

## 2019-02-01 PROCEDURE — 80053 COMPREHEN METABOLIC PANEL: CPT | Performed by: EMERGENCY MEDICINE

## 2019-02-01 PROCEDURE — 36415 COLL VENOUS BLD VENIPUNCTURE: CPT

## 2019-02-01 PROCEDURE — 99220 PR INITIAL OBSERVATION CARE/DAY 70 MINUTES: CPT | Performed by: SURGERY

## 2019-02-01 PROCEDURE — 74177 CT ABD & PELVIS W/CONTRAST: CPT

## 2019-02-01 PROCEDURE — 76705 ECHO EXAM OF ABDOMEN: CPT

## 2019-02-01 PROCEDURE — 93005 ELECTROCARDIOGRAM TRACING: CPT

## 2019-02-01 PROCEDURE — 99285 EMERGENCY DEPT VISIT HI MDM: CPT

## 2019-02-01 PROCEDURE — 96375 TX/PRO/DX INJ NEW DRUG ADDON: CPT

## 2019-02-01 PROCEDURE — 84484 ASSAY OF TROPONIN QUANT: CPT | Performed by: EMERGENCY MEDICINE

## 2019-02-01 PROCEDURE — 96374 THER/PROPH/DIAG INJ IV PUSH: CPT

## 2019-02-01 PROCEDURE — 83690 ASSAY OF LIPASE: CPT | Performed by: EMERGENCY MEDICINE

## 2019-02-01 PROCEDURE — 85025 COMPLETE CBC W/AUTO DIFF WBC: CPT | Performed by: EMERGENCY MEDICINE

## 2019-02-01 RX ORDER — KETOROLAC TROMETHAMINE 30 MG/ML
30 INJECTION, SOLUTION INTRAMUSCULAR; INTRAVENOUS ONCE
Status: COMPLETED | OUTPATIENT
Start: 2019-02-01 | End: 2019-02-01

## 2019-02-01 RX ORDER — ONDANSETRON 2 MG/ML
4 INJECTION INTRAMUSCULAR; INTRAVENOUS ONCE
Status: COMPLETED | OUTPATIENT
Start: 2019-02-01 | End: 2019-02-01

## 2019-02-01 RX ORDER — SODIUM CHLORIDE, SODIUM LACTATE, POTASSIUM CHLORIDE, CALCIUM CHLORIDE 600; 310; 30; 20 MG/100ML; MG/100ML; MG/100ML; MG/100ML
125 INJECTION, SOLUTION INTRAVENOUS CONTINUOUS
Status: DISCONTINUED | OUTPATIENT
Start: 2019-02-01 | End: 2019-02-02 | Stop reason: HOSPADM

## 2019-02-01 RX ORDER — CIPROFLOXACIN 2 MG/ML
400 INJECTION, SOLUTION INTRAVENOUS EVERY 12 HOURS
Status: DISCONTINUED | OUTPATIENT
Start: 2019-02-01 | End: 2019-02-02 | Stop reason: HOSPADM

## 2019-02-01 RX ORDER — ONDANSETRON 2 MG/ML
4 INJECTION INTRAMUSCULAR; INTRAVENOUS EVERY 8 HOURS PRN
Status: DISCONTINUED | OUTPATIENT
Start: 2019-02-01 | End: 2019-02-02 | Stop reason: HOSPADM

## 2019-02-01 RX ORDER — ACETAMINOPHEN 325 MG/1
650 TABLET ORAL EVERY 6 HOURS PRN
Status: DISCONTINUED | OUTPATIENT
Start: 2019-02-01 | End: 2019-02-02 | Stop reason: HOSPADM

## 2019-02-01 RX ORDER — PANTOPRAZOLE SODIUM 40 MG/1
40 INJECTION, POWDER, FOR SOLUTION INTRAVENOUS ONCE
Status: COMPLETED | OUTPATIENT
Start: 2019-02-01 | End: 2019-02-01

## 2019-02-01 RX ADMIN — PANTOPRAZOLE SODIUM 40 MG: 40 INJECTION, POWDER, FOR SOLUTION INTRAVENOUS at 20:58

## 2019-02-01 RX ADMIN — IOHEXOL 100 ML: 350 INJECTION, SOLUTION INTRAVENOUS at 20:46

## 2019-02-01 RX ADMIN — MORPHINE SULFATE 2 MG: 2 INJECTION, SOLUTION INTRAMUSCULAR; INTRAVENOUS at 20:57

## 2019-02-01 RX ADMIN — KETOROLAC TROMETHAMINE 30 MG: 30 INJECTION, SOLUTION INTRAMUSCULAR; INTRAVENOUS at 21:57

## 2019-02-01 RX ADMIN — SODIUM CHLORIDE, POTASSIUM CHLORIDE, SODIUM LACTATE AND CALCIUM CHLORIDE 125 ML/HR: 600; 310; 30; 20 INJECTION, SOLUTION INTRAVENOUS at 23:00

## 2019-02-01 RX ADMIN — CIPROFLOXACIN 400 MG: 2 INJECTION, SOLUTION INTRAVENOUS at 23:00

## 2019-02-01 RX ADMIN — ONDANSETRON 4 MG: 2 INJECTION INTRAMUSCULAR; INTRAVENOUS at 21:57

## 2019-02-02 ENCOUNTER — HOSPITAL ENCOUNTER (OUTPATIENT)
Facility: HOSPITAL | Age: 59
Setting detail: OBSERVATION
Discharge: HOME | End: 2019-02-04
Attending: SURGERY | Admitting: SURGERY
Payer: COMMERCIAL

## 2019-02-02 ENCOUNTER — ANESTHESIA EVENT (OUTPATIENT)
Dept: PERIOP | Facility: HOSPITAL | Age: 59
End: 2019-02-02
Payer: COMMERCIAL

## 2019-02-02 ENCOUNTER — ANESTHESIA (OUTPATIENT)
Dept: PERIOP | Facility: HOSPITAL | Age: 59
End: 2019-02-02
Payer: COMMERCIAL

## 2019-02-02 ENCOUNTER — APPOINTMENT (OUTPATIENT)
Dept: RADIOLOGY | Facility: HOSPITAL | Age: 59
End: 2019-02-02
Payer: COMMERCIAL

## 2019-02-02 VITALS
WEIGHT: 191.9 LBS | HEIGHT: 71 IN | HEART RATE: 78 BPM | TEMPERATURE: 100.3 F | RESPIRATION RATE: 18 BRPM | BODY MASS INDEX: 26.86 KG/M2 | OXYGEN SATURATION: 93 % | DIASTOLIC BLOOD PRESSURE: 84 MMHG | SYSTOLIC BLOOD PRESSURE: 147 MMHG

## 2019-02-02 DIAGNOSIS — K81.9 CHOLECYSTITIS: ICD-10-CM

## 2019-02-02 LAB
ABO GROUP BLD: NORMAL
ALBUMIN SERPL BCP-MCNC: 4.4 G/DL (ref 3.5–5.7)
ALP SERPL-CCNC: 46 U/L (ref 55–165)
ALT SERPL W P-5'-P-CCNC: 26 U/L (ref 7–52)
ANION GAP SERPL CALCULATED.3IONS-SCNC: 10 MMOL/L (ref 4–13)
AST SERPL W P-5'-P-CCNC: 17 U/L (ref 13–39)
ATRIAL RATE: 77 BPM
BASOPHILS # BLD AUTO: 0 THOUSANDS/ΜL (ref 0–0.1)
BASOPHILS NFR BLD AUTO: 0 % (ref 0–2)
BILIRUB SERPL-MCNC: 1.3 MG/DL (ref 0.2–1)
BLD GP AB SCN SERPL QL: NEGATIVE
BUN SERPL-MCNC: 11 MG/DL (ref 7–25)
CALCIUM SERPL-MCNC: 9.4 MG/DL (ref 8.6–10.5)
CHLORIDE SERPL-SCNC: 100 MMOL/L (ref 98–107)
CO2 SERPL-SCNC: 27 MMOL/L (ref 21–31)
CREAT SERPL-MCNC: 1.07 MG/DL (ref 0.7–1.3)
EOSINOPHIL # BLD AUTO: 0 THOUSAND/ΜL (ref 0–0.61)
EOSINOPHIL NFR BLD AUTO: 0 % (ref 0–5)
ERYTHROCYTE [DISTWIDTH] IN BLOOD BY AUTOMATED COUNT: 13.2 % (ref 11.5–14.5)
GFR SERPL CREATININE-BSD FRML MDRD: 75 ML/MIN/1.73SQ M
GLUCOSE SERPL-MCNC: 113 MG/DL (ref 65–99)
GLUCOSE SERPL-MCNC: 146 MG/DL (ref 65–140)
HCT VFR BLD AUTO: 44.6 % (ref 36.5–49.3)
HGB BLD-MCNC: 15.5 G/DL (ref 14–18)
LYMPHOCYTES # BLD AUTO: 1.3 THOUSANDS/ΜL (ref 0.6–4.47)
LYMPHOCYTES NFR BLD AUTO: 10 % (ref 21–51)
MCH RBC QN AUTO: 31.3 PG (ref 26–34)
MCHC RBC AUTO-ENTMCNC: 34.8 G/DL (ref 31–37)
MCV RBC AUTO: 90 FL (ref 81–99)
MONOCYTES # BLD AUTO: 1.4 THOUSAND/ΜL (ref 0.17–1.22)
MONOCYTES NFR BLD AUTO: 10 % (ref 2–12)
NEUTROPHILS # BLD AUTO: 10.7 THOUSANDS/ΜL (ref 1.4–6.5)
NEUTS SEG NFR BLD AUTO: 80 % (ref 42–75)
NRBC BLD AUTO-RTO: 0 /100 WBCS
P AXIS: 46 DEGREES
PLATELET # BLD AUTO: 274 THOUSANDS/UL (ref 149–390)
PMV BLD AUTO: 8.5 FL (ref 8.6–11.7)
POTASSIUM SERPL-SCNC: 3.9 MMOL/L (ref 3.5–5.5)
PR INTERVAL: 154 MS
PROT SERPL-MCNC: 7.2 G/DL (ref 6.4–8.9)
QRS AXIS: 9 DEGREES
QRSD INTERVAL: 86 MS
QT INTERVAL: 386 MS
QTC INTERVAL: 436 MS
RBC # BLD AUTO: 4.96 MILLION/UL (ref 4.3–5.9)
RH BLD: POSITIVE
SODIUM SERPL-SCNC: 137 MMOL/L (ref 134–143)
SPECIMEN EXPIRATION DATE: NORMAL
T WAVE AXIS: 44 DEGREES
VENTRICULAR RATE: 77 BPM
WBC # BLD AUTO: 13.5 THOUSAND/UL (ref 4.8–10.8)

## 2019-02-02 PROCEDURE — C1729 CATH, DRAINAGE: HCPCS | Performed by: SURGERY

## 2019-02-02 PROCEDURE — 88304 TISSUE EXAM BY PATHOLOGIST: CPT | Performed by: PATHOLOGY

## 2019-02-02 PROCEDURE — 93010 ELECTROCARDIOGRAM REPORT: CPT | Performed by: INTERNAL MEDICINE

## 2019-02-02 PROCEDURE — 86850 RBC ANTIBODY SCREEN: CPT | Performed by: STUDENT IN AN ORGANIZED HEALTH CARE EDUCATION/TRAINING PROGRAM

## 2019-02-02 PROCEDURE — 36415 COLL VENOUS BLD VENIPUNCTURE: CPT | Performed by: STUDENT IN AN ORGANIZED HEALTH CARE EDUCATION/TRAINING PROGRAM

## 2019-02-02 PROCEDURE — 86900 BLOOD TYPING SEROLOGIC ABO: CPT | Performed by: STUDENT IN AN ORGANIZED HEALTH CARE EDUCATION/TRAINING PROGRAM

## 2019-02-02 PROCEDURE — 47562 LAPAROSCOPIC CHOLECYSTECTOMY: CPT | Performed by: SURGERY

## 2019-02-02 PROCEDURE — 99285 EMERGENCY DEPT VISIT HI MDM: CPT

## 2019-02-02 PROCEDURE — 86901 BLOOD TYPING SEROLOGIC RH(D): CPT | Performed by: STUDENT IN AN ORGANIZED HEALTH CARE EDUCATION/TRAINING PROGRAM

## 2019-02-02 PROCEDURE — 85025 COMPLETE CBC W/AUTO DIFF WBC: CPT | Performed by: SURGERY

## 2019-02-02 PROCEDURE — 74300 X-RAY BILE DUCTS/PANCREAS: CPT

## 2019-02-02 PROCEDURE — 80053 COMPREHEN METABOLIC PANEL: CPT | Performed by: SURGERY

## 2019-02-02 PROCEDURE — 87040 BLOOD CULTURE FOR BACTERIA: CPT | Performed by: SURGERY

## 2019-02-02 PROCEDURE — 93005 ELECTROCARDIOGRAM TRACING: CPT

## 2019-02-02 PROCEDURE — 99217 PR OBSERVATION CARE DISCHARGE MANAGEMENT: CPT | Performed by: SURGERY

## 2019-02-02 PROCEDURE — 99220 PR INITIAL OBSERVATION CARE/DAY 70 MINUTES: CPT | Performed by: SURGERY

## 2019-02-02 PROCEDURE — 82948 REAGENT STRIP/BLOOD GLUCOSE: CPT

## 2019-02-02 RX ORDER — HYDROMORPHONE HCL/PF 1 MG/ML
1 SYRINGE (ML) INJECTION EVERY 4 HOURS PRN
Status: DISCONTINUED | OUTPATIENT
Start: 2019-02-02 | End: 2019-02-02

## 2019-02-02 RX ORDER — DOCUSATE SODIUM 100 MG/1
100 CAPSULE, LIQUID FILLED ORAL 2 TIMES DAILY PRN
Status: DISCONTINUED | OUTPATIENT
Start: 2019-02-02 | End: 2019-02-04 | Stop reason: HOSPADM

## 2019-02-02 RX ORDER — AMLODIPINE BESYLATE 5 MG/1
5 TABLET ORAL DAILY
Status: DISCONTINUED | OUTPATIENT
Start: 2019-02-02 | End: 2019-02-02 | Stop reason: HOSPADM

## 2019-02-02 RX ORDER — ONDANSETRON 2 MG/ML
INJECTION INTRAMUSCULAR; INTRAVENOUS AS NEEDED
Status: DISCONTINUED | OUTPATIENT
Start: 2019-02-02 | End: 2019-02-02 | Stop reason: SURG

## 2019-02-02 RX ORDER — HEPARIN SODIUM 5000 [USP'U]/ML
5000 INJECTION, SOLUTION INTRAVENOUS; SUBCUTANEOUS EVERY 8 HOURS SCHEDULED
Status: DISCONTINUED | OUTPATIENT
Start: 2019-02-02 | End: 2019-02-02 | Stop reason: SDUPTHER

## 2019-02-02 RX ORDER — SODIUM CHLORIDE, SODIUM LACTATE, POTASSIUM CHLORIDE, CALCIUM CHLORIDE 600; 310; 30; 20 MG/100ML; MG/100ML; MG/100ML; MG/100ML
125 INJECTION, SOLUTION INTRAVENOUS CONTINUOUS
Status: CANCELLED | OUTPATIENT
Start: 2019-02-02

## 2019-02-02 RX ORDER — CIPROFLOXACIN 2 MG/ML
400 INJECTION, SOLUTION INTRAVENOUS EVERY 12 HOURS
Status: CANCELLED | OUTPATIENT
Start: 2019-02-02

## 2019-02-02 RX ORDER — ACETAMINOPHEN 325 MG/1
650 TABLET ORAL EVERY 6 HOURS PRN
Status: DISCONTINUED | OUTPATIENT
Start: 2019-02-02 | End: 2019-02-02

## 2019-02-02 RX ORDER — ONDANSETRON 2 MG/ML
4 INJECTION INTRAMUSCULAR; INTRAVENOUS EVERY 4 HOURS PRN
Status: DISCONTINUED | OUTPATIENT
Start: 2019-02-02 | End: 2019-02-04 | Stop reason: HOSPADM

## 2019-02-02 RX ORDER — HYDROMORPHONE HCL/PF 1 MG/ML
1 SYRINGE (ML) INJECTION
Status: CANCELLED | OUTPATIENT
Start: 2019-02-02

## 2019-02-02 RX ORDER — METOCLOPRAMIDE HYDROCHLORIDE 5 MG/ML
INJECTION INTRAMUSCULAR; INTRAVENOUS AS NEEDED
Status: DISCONTINUED | OUTPATIENT
Start: 2019-02-02 | End: 2019-02-02 | Stop reason: SURG

## 2019-02-02 RX ORDER — ROCURONIUM BROMIDE 10 MG/ML
INJECTION, SOLUTION INTRAVENOUS AS NEEDED
Status: DISCONTINUED | OUTPATIENT
Start: 2019-02-02 | End: 2019-02-02 | Stop reason: SURG

## 2019-02-02 RX ORDER — LIDOCAINE HYDROCHLORIDE 10 MG/ML
INJECTION, SOLUTION INFILTRATION; PERINEURAL AS NEEDED
Status: DISCONTINUED | OUTPATIENT
Start: 2019-02-02 | End: 2019-02-02 | Stop reason: SURG

## 2019-02-02 RX ORDER — KETOROLAC TROMETHAMINE 30 MG/ML
30 INJECTION, SOLUTION INTRAMUSCULAR; INTRAVENOUS EVERY 6 HOURS SCHEDULED
Status: DISCONTINUED | OUTPATIENT
Start: 2019-02-02 | End: 2019-02-02 | Stop reason: HOSPADM

## 2019-02-02 RX ORDER — HYDROMORPHONE HCL/PF 1 MG/ML
0.2 SYRINGE (ML) INJECTION
Status: DISCONTINUED | OUTPATIENT
Start: 2019-02-02 | End: 2019-02-02 | Stop reason: HOSPADM

## 2019-02-02 RX ORDER — ACETAMINOPHEN 325 MG/1
650 TABLET ORAL EVERY 6 HOURS PRN
Status: DISCONTINUED | OUTPATIENT
Start: 2019-02-02 | End: 2019-02-04 | Stop reason: HOSPADM

## 2019-02-02 RX ORDER — SODIUM CHLORIDE 9 MG/ML
INJECTION, SOLUTION INTRAVENOUS CONTINUOUS PRN
Status: DISCONTINUED | OUTPATIENT
Start: 2019-02-02 | End: 2019-02-02 | Stop reason: SURG

## 2019-02-02 RX ORDER — MAGNESIUM HYDROXIDE 1200 MG/15ML
LIQUID ORAL AS NEEDED
Status: DISCONTINUED | OUTPATIENT
Start: 2019-02-02 | End: 2019-02-02 | Stop reason: HOSPADM

## 2019-02-02 RX ORDER — OXYCODONE HYDROCHLORIDE AND ACETAMINOPHEN 5; 325 MG/1; MG/1
2 TABLET ORAL EVERY 4 HOURS PRN
Status: DISCONTINUED | OUTPATIENT
Start: 2019-02-02 | End: 2019-02-02

## 2019-02-02 RX ORDER — SODIUM CHLORIDE 9 MG/ML
100 INJECTION, SOLUTION INTRAVENOUS CONTINUOUS
Status: DISCONTINUED | OUTPATIENT
Start: 2019-02-02 | End: 2019-02-02

## 2019-02-02 RX ORDER — SODIUM CHLORIDE, SODIUM LACTATE, POTASSIUM CHLORIDE, CALCIUM CHLORIDE 600; 310; 30; 20 MG/100ML; MG/100ML; MG/100ML; MG/100ML
20 INJECTION, SOLUTION INTRAVENOUS CONTINUOUS
Status: DISCONTINUED | OUTPATIENT
Start: 2019-02-02 | End: 2019-02-03

## 2019-02-02 RX ORDER — HEPARIN SODIUM 5000 [USP'U]/ML
5000 INJECTION, SOLUTION INTRAVENOUS; SUBCUTANEOUS EVERY 8 HOURS SCHEDULED
Status: DISCONTINUED | OUTPATIENT
Start: 2019-02-02 | End: 2019-02-04 | Stop reason: HOSPADM

## 2019-02-02 RX ORDER — OXYCODONE HYDROCHLORIDE 5 MG/1
5 TABLET ORAL EVERY 4 HOURS PRN
Status: DISCONTINUED | OUTPATIENT
Start: 2019-02-02 | End: 2019-02-04

## 2019-02-02 RX ORDER — ONDANSETRON 2 MG/ML
4 INJECTION INTRAMUSCULAR; INTRAVENOUS ONCE AS NEEDED
Status: COMPLETED | OUTPATIENT
Start: 2019-02-02 | End: 2019-02-02

## 2019-02-02 RX ORDER — OXYCODONE HYDROCHLORIDE 5 MG/1
10 TABLET ORAL EVERY 4 HOURS PRN
Status: DISCONTINUED | OUTPATIENT
Start: 2019-02-02 | End: 2019-02-04 | Stop reason: HOSPADM

## 2019-02-02 RX ORDER — AMLODIPINE BESYLATE 5 MG/1
5 TABLET ORAL DAILY
Status: CANCELLED | OUTPATIENT
Start: 2019-02-03

## 2019-02-02 RX ORDER — FENTANYL CITRATE 50 UG/ML
INJECTION, SOLUTION INTRAMUSCULAR; INTRAVENOUS AS NEEDED
Status: DISCONTINUED | OUTPATIENT
Start: 2019-02-02 | End: 2019-02-02 | Stop reason: SURG

## 2019-02-02 RX ORDER — KETOROLAC TROMETHAMINE 30 MG/ML
30 INJECTION, SOLUTION INTRAMUSCULAR; INTRAVENOUS EVERY 6 HOURS SCHEDULED
Status: CANCELLED | OUTPATIENT
Start: 2019-02-02 | End: 2019-02-07

## 2019-02-02 RX ORDER — ACETAMINOPHEN 325 MG/1
650 TABLET ORAL EVERY 6 HOURS PRN
Status: CANCELLED | OUTPATIENT
Start: 2019-02-02

## 2019-02-02 RX ORDER — FENTANYL CITRATE/PF 50 MCG/ML
25 SYRINGE (ML) INJECTION
Status: DISCONTINUED | OUTPATIENT
Start: 2019-02-02 | End: 2019-02-02 | Stop reason: HOSPADM

## 2019-02-02 RX ORDER — MIDAZOLAM HYDROCHLORIDE 1 MG/ML
INJECTION INTRAMUSCULAR; INTRAVENOUS AS NEEDED
Status: DISCONTINUED | OUTPATIENT
Start: 2019-02-02 | End: 2019-02-02 | Stop reason: SURG

## 2019-02-02 RX ORDER — HYDROMORPHONE HCL/PF 1 MG/ML
1 SYRINGE (ML) INJECTION
Status: DISCONTINUED | OUTPATIENT
Start: 2019-02-02 | End: 2019-02-02 | Stop reason: HOSPADM

## 2019-02-02 RX ORDER — HYDROMORPHONE HCL/PF 1 MG/ML
0.5 SYRINGE (ML) INJECTION
Status: DISCONTINUED | OUTPATIENT
Start: 2019-02-02 | End: 2019-02-04 | Stop reason: HOSPADM

## 2019-02-02 RX ORDER — ONDANSETRON 2 MG/ML
4 INJECTION INTRAMUSCULAR; INTRAVENOUS EVERY 8 HOURS PRN
Status: CANCELLED | OUTPATIENT
Start: 2019-02-02

## 2019-02-02 RX ORDER — PROPOFOL 10 MG/ML
INJECTION, EMULSION INTRAVENOUS AS NEEDED
Status: DISCONTINUED | OUTPATIENT
Start: 2019-02-02 | End: 2019-02-02 | Stop reason: SURG

## 2019-02-02 RX ORDER — NEOSTIGMINE METHYLSULFATE 1 MG/ML
INJECTION INTRAVENOUS AS NEEDED
Status: DISCONTINUED | OUTPATIENT
Start: 2019-02-02 | End: 2019-02-02 | Stop reason: SURG

## 2019-02-02 RX ORDER — ACETAMINOPHEN 325 MG/1
650 TABLET ORAL EVERY 6 HOURS PRN
Status: DISCONTINUED | OUTPATIENT
Start: 2019-02-02 | End: 2019-02-02 | Stop reason: SDUPTHER

## 2019-02-02 RX ORDER — DIPHENHYDRAMINE HYDROCHLORIDE 50 MG/ML
25 INJECTION INTRAMUSCULAR; INTRAVENOUS EVERY 6 HOURS PRN
Status: DISCONTINUED | OUTPATIENT
Start: 2019-02-02 | End: 2019-02-04 | Stop reason: HOSPADM

## 2019-02-02 RX ORDER — OXYCODONE HYDROCHLORIDE AND ACETAMINOPHEN 5; 325 MG/1; MG/1
1 TABLET ORAL EVERY 4 HOURS PRN
Status: DISCONTINUED | OUTPATIENT
Start: 2019-02-02 | End: 2019-02-02

## 2019-02-02 RX ORDER — BUPIVACAINE HYDROCHLORIDE 5 MG/ML
INJECTION, SOLUTION PERINEURAL AS NEEDED
Status: DISCONTINUED | OUTPATIENT
Start: 2019-02-02 | End: 2019-02-02 | Stop reason: HOSPADM

## 2019-02-02 RX ORDER — SODIUM CHLORIDE 9 MG/ML
75 INJECTION, SOLUTION INTRAVENOUS CONTINUOUS
Status: DISCONTINUED | OUTPATIENT
Start: 2019-02-02 | End: 2019-02-03

## 2019-02-02 RX ORDER — CEFAZOLIN SODIUM 2 G/50ML
2000 SOLUTION INTRAVENOUS EVERY 8 HOURS
Status: DISCONTINUED | OUTPATIENT
Start: 2019-02-02 | End: 2019-02-02

## 2019-02-02 RX ORDER — GLYCOPYRROLATE 0.2 MG/ML
INJECTION INTRAMUSCULAR; INTRAVENOUS AS NEEDED
Status: DISCONTINUED | OUTPATIENT
Start: 2019-02-02 | End: 2019-02-02 | Stop reason: SURG

## 2019-02-02 RX ORDER — AMLODIPINE BESYLATE 5 MG/1
5 TABLET ORAL DAILY
Status: DISCONTINUED | OUTPATIENT
Start: 2019-02-03 | End: 2019-02-04 | Stop reason: HOSPADM

## 2019-02-02 RX ADMIN — ONDANSETRON 4 MG: 2 INJECTION INTRAMUSCULAR; INTRAVENOUS at 08:06

## 2019-02-02 RX ADMIN — ACETAMINOPHEN 650 MG: 325 TABLET ORAL at 02:02

## 2019-02-02 RX ADMIN — SODIUM CHLORIDE 100 ML/HR: 0.9 INJECTION, SOLUTION INTRAVENOUS at 12:36

## 2019-02-02 RX ADMIN — OXYCODONE HYDROCHLORIDE AND ACETAMINOPHEN 2 TABLET: 5; 325 TABLET ORAL at 19:22

## 2019-02-02 RX ADMIN — METRONIDAZOLE 500 MG: 500 INJECTION, SOLUTION INTRAVENOUS at 08:54

## 2019-02-02 RX ADMIN — ROCURONIUM BROMIDE 30 MG: 10 INJECTION INTRAVENOUS at 14:14

## 2019-02-02 RX ADMIN — METRONIDAZOLE 500 MG: 500 INJECTION, SOLUTION INTRAVENOUS at 00:10

## 2019-02-02 RX ADMIN — SODIUM CHLORIDE, POTASSIUM CHLORIDE, SODIUM LACTATE AND CALCIUM CHLORIDE 125 ML/HR: 600; 310; 30; 20 INJECTION, SOLUTION INTRAVENOUS at 05:24

## 2019-02-02 RX ADMIN — SODIUM CHLORIDE: 0.9 INJECTION, SOLUTION INTRAVENOUS at 13:33

## 2019-02-02 RX ADMIN — ONDANSETRON 4 MG: 2 INJECTION INTRAMUSCULAR; INTRAVENOUS at 17:01

## 2019-02-02 RX ADMIN — SODIUM CHLORIDE: 0.9 INJECTION, SOLUTION INTRAVENOUS at 13:52

## 2019-02-02 RX ADMIN — ONDANSETRON 4 MG: 2 INJECTION INTRAMUSCULAR; INTRAVENOUS at 15:39

## 2019-02-02 RX ADMIN — SODIUM CHLORIDE 75 ML/HR: 0.9 INJECTION, SOLUTION INTRAVENOUS at 19:00

## 2019-02-02 RX ADMIN — AMLODIPINE BESYLATE 5 MG: 5 TABLET ORAL at 08:23

## 2019-02-02 RX ADMIN — ROCURONIUM BROMIDE 50 MG: 10 INJECTION INTRAVENOUS at 13:12

## 2019-02-02 RX ADMIN — METOCLOPRAMIDE 10 MG: 5 INJECTION, SOLUTION INTRAMUSCULAR; INTRAVENOUS at 15:39

## 2019-02-02 RX ADMIN — CEFAZOLIN SODIUM 2000 MG: 2 SOLUTION INTRAVENOUS at 13:30

## 2019-02-02 RX ADMIN — OXYCODONE HYDROCHLORIDE 10 MG: 10 TABLET ORAL at 22:34

## 2019-02-02 RX ADMIN — PROPOFOL 200 MG: 10 INJECTION, EMULSION INTRAVENOUS at 13:12

## 2019-02-02 RX ADMIN — MORPHINE SULFATE 2 MG: 2 INJECTION, SOLUTION INTRAMUSCULAR; INTRAVENOUS at 00:58

## 2019-02-02 RX ADMIN — LIDOCAINE HYDROCHLORIDE 50 MG: 10 INJECTION, SOLUTION INFILTRATION; PERINEURAL at 13:12

## 2019-02-02 RX ADMIN — CEFAZOLIN SODIUM 1000 MG: 10 INJECTION, POWDER, FOR SOLUTION INTRAVENOUS at 22:15

## 2019-02-02 RX ADMIN — HYDROMORPHONE HYDROCHLORIDE 1 MG: 1 INJECTION, SOLUTION INTRAMUSCULAR; INTRAVENOUS; SUBCUTANEOUS at 10:19

## 2019-02-02 RX ADMIN — HYDROMORPHONE HYDROCHLORIDE 1 MG: 1 INJECTION, SOLUTION INTRAMUSCULAR; INTRAVENOUS; SUBCUTANEOUS at 05:22

## 2019-02-02 RX ADMIN — HEPARIN SODIUM 5000 UNITS: 5000 INJECTION INTRAVENOUS; SUBCUTANEOUS at 22:15

## 2019-02-02 RX ADMIN — HYDROMORPHONE HYDROCHLORIDE 1 MG: 1 INJECTION, SOLUTION INTRAMUSCULAR; INTRAVENOUS; SUBCUTANEOUS at 02:32

## 2019-02-02 RX ADMIN — NEOSTIGMINE METHYLSULFATE 4 MG: 1 INJECTION INTRAVENOUS at 16:16

## 2019-02-02 RX ADMIN — METRONIDAZOLE 500 MG: 500 INJECTION, SOLUTION INTRAVENOUS at 17:24

## 2019-02-02 RX ADMIN — KETOROLAC TROMETHAMINE 30 MG: 30 INJECTION, SOLUTION INTRAMUSCULAR; INTRAVENOUS at 08:12

## 2019-02-02 RX ADMIN — GLYCOPYRROLATE 0.6 MG: 0.2 INJECTION, SOLUTION INTRAMUSCULAR; INTRAVENOUS at 16:16

## 2019-02-02 RX ADMIN — DEXAMETHASONE SODIUM PHOSPHATE 4 MG: 10 INJECTION INTRAMUSCULAR; INTRAVENOUS at 15:39

## 2019-02-02 RX ADMIN — FENTANYL CITRATE 100 MCG: 50 INJECTION, SOLUTION INTRAMUSCULAR; INTRAVENOUS at 13:11

## 2019-02-02 RX ADMIN — MIDAZOLAM 2 MG: 1 INJECTION INTRAMUSCULAR; INTRAVENOUS at 13:09

## 2019-02-02 RX ADMIN — HYDROMORPHONE HYDROCHLORIDE 1 MG: 1 INJECTION, SOLUTION INTRAMUSCULAR; INTRAVENOUS; SUBCUTANEOUS at 08:04

## 2019-02-02 RX ADMIN — ONDANSETRON 4 MG: 2 INJECTION INTRAMUSCULAR; INTRAVENOUS at 01:02

## 2019-02-02 NOTE — ANESTHESIA POSTPROCEDURE EVALUATION
Post-Op Assessment Note      CV Status:  Stable    Mental Status:  Awake    Hydration Status:  Stable    PONV Controlled:  None    Airway Patency:  Patent    Post Op Vitals Reviewed: Yes          Staff: Anesthesiologist, CRNA           BP      Temp   98 6   Pulse  101   Resp   18   SpO2   98

## 2019-02-02 NOTE — H&P
Consultation - General Surgery   Jacy Nix 59 y o  male MRN: [id number]  Unit/Bed#: ED 01 Encounter: [id number]    Assessment/Plan     Assessment:  56-year-old male with history of hypertension, admitted with acute onset of upper abdominal pain CT scan showing gallbladder stones and noted significant gallbladder distention likely consistent with acute cholecystitis  Leukocytosis of 12  Bilirubin slightly elevated 1 2  Plan:  Acute cholecystitis  - IV fluids  - IV antibiotics  - NPO except meds  - pain control  - follow-up right upper quadrant ultrasound    Hyperbilirubinemia  - recheck CMP in the morning to trend bilirubin  - follow-up right upper quadrant ultrasound to assess for CBD measurement for the potential of possible choledocholithiasis  - this could be secondary to choledocholithiasis versus acute cholecystitis causing biliary stasis      Hypertension  - continue home meds    History of Present Illness     HPI:  Jacy Nix is a 59 y o  male with history hypertension, hyperlipidemia, presents to the ER with worsening upper abdominal pain/chest pain eating dinner Thursday evening  Pain persistently at worse over Friday and was associated with nausea and therefore patient sought medical treatment in the ER  Sudarshan Acevedo vomiting  Pain is persistent sharp absolutely excruciating and mostly epigastric right upper quadrant  No similar episodes in the past   Although 2 weeks ago did complain of some upper abdominal pain and burning which was told was heartburn  Denies any chest pain or shortness of breath  No fevers or chills  While in the ER patient underwent CT which showed evidence of cholelithiasis in addition to a very distended gallbladder  Labs revealed leukocytosis of 12  CMP revealing normal transaminases however there was a slight hyperbilirubinemia with a bilirubin of 1 2        Consults    Review of Systems     Ten point review of systems conducted, all negative except as noted above HPI  Historical Information   Past Medical History:   Diagnosis Date    Elevated PSA     Hyperlipidemia     Hypertension      History reviewed  No pertinent surgical history  Social History   History   Alcohol Use    Yes     Comment: SOCIAL      History   Drug Use No     History   Smoking Status    Never Smoker   Smokeless Tobacco    Never Used     Family History: non-contributory    Meds/Allergies   all current active meds have been reviewed  No Known Allergies    Objective   First Vitals:   Blood Pressure: (!) 180/91 (02/01/19 1952)  Pulse: 83 (02/01/19 1952)  Temperature: 97 9 °F (36 6 °C) (02/01/19 1952)  Temp Source: Temporal (02/01/19 1952)  Respirations: 18 (02/01/19 1952)  Height: 5' 11" (180 3 cm) (02/01/19 1950)  Weight - Scale: 90 7 kg (200 lb) (02/01/19 1950)  SpO2: 98 % (02/01/19 1952)    Current Vitals:   Blood Pressure: 140/55 (02/01/19 2102)  Pulse: 77 (02/01/19 2102)  Temperature: 97 9 °F (36 6 °C) (02/01/19 1952)  Temp Source: Temporal (02/01/19 1952)  Respirations: 16 (02/01/19 2102)  Height: 5' 11" (180 3 cm) (02/01/19 1950)  Weight - Scale: 90 7 kg (200 lb) (02/01/19 1950)  SpO2: 99 % (02/01/19 2102)    No intake or output data in the 24 hours ending 02/01/19 2228    Invasive Devices     Peripheral Intravenous Line            Peripheral IV 02/01/19 Left Wrist less than 1 day                Physical Exam   General:  Patient appears in moderate distress in significant pain    HEENT:  Normocephalic, atraumatic trachea midline  CV:  S1-S2 audible, regular rate rhythm no murmurs rubs  Pulmonary:  Decreased breath sounds at bases otherwise clear auscultation  GI:  Abdomen is mildly distended, exquisitely tender palpation the right upper quadrant with localized peritonitis, positive rebound  MSK:  Lower extremities without clubbing cyanosis  Neurologic:  Alert oriented x3, cranial 2-12 grossly intact  Psych:  Mood and affect appropriate    Lab Results:   I have personally reviewed pertinent lab results  , CBC:   Lab Results   Component Value Date    WBC 12 80 (H) 02/01/2019    HGB 15 8 02/01/2019    HCT 46 5 02/01/2019    MCV 90 02/01/2019     02/01/2019    MCH 30 6 02/01/2019    MCHC 34 1 02/01/2019    RDW 13 2 02/01/2019    MPV 8 1 (L) 02/01/2019    NRBC 0 02/01/2019   , CMP:   Lab Results   Component Value Date    SODIUM 135 02/01/2019    K 4 0 02/01/2019     02/01/2019    CO2 24 02/01/2019    BUN 10 02/01/2019    CREATININE 1 01 02/01/2019    CALCIUM 9 7 02/01/2019    AST 19 02/01/2019    ALT 29 02/01/2019    ALKPHOS 49 (L) 02/01/2019    EGFR 80 02/01/2019   , Coagulation: No results found for: PT, INR, APTT, Urinalysis: No results found for: Tedra Rosalie, SPECGRAV, PHUR, LEUKOCYTESUR, NITRITE, PROTEINUA, GLUCOSEU, KETONESU, BILIRUBINUR, BLOODU, Amylase: No results found for: AMYLASE, Lipase:   Lab Results   Component Value Date    LIPASE 28 02/01/2019     Imaging: I have personally reviewed pertinent films in PACS  EKG, Pathology, and Other Studies: I have personally reviewed pertinent reports

## 2019-02-02 NOTE — H&P
H&P Exam - General Surgery   Chidi Vidal 59 y o  male MRN: [id number]  Unit/Bed#: ED 01 Encounter: [id number]    Assessment/Plan     Assessment:  Pt is a 59y o  M with acute cholecystitis      Plan:  Admit to surgery service  NPO/IVF  Ancef/flagyl  Prn pain control  Monitor LFTs  OR for lap skye, possible open, with IOC     History of Present Illness     HPI: Chidi Vidal is a 59 y o  male who presents with pmh HTN, HLD, no past surgeries, he presents with 3 days of RUQ abdominal pain  This has happened once in the past 2 weeks ago and resolved on its own  It is sharp and constant  No N/V, no diarrhea, he had a fever to 100 3 at the OSH where he was admitted for a day on cipro/flagyl  He has never noticed changes in his skin or stool color  He does not get the pain with fried or fatty food  CT scan shows a 19cm gallbladder with a stone in the neck  He has a mildly elevated T bili or 1 3 and WBC 13 5  Review of Systems   Constitutional: Positive for appetite change  Negative for activity change  HENT: Negative  Eyes: Negative  Respiratory: Negative for chest tightness and shortness of breath  Cardiovascular: Negative  Gastrointestinal: Positive for abdominal pain and constipation  Negative for abdominal distention, diarrhea, nausea and vomiting  Genitourinary: Negative  Musculoskeletal: Negative  Skin: Negative  Neurological: Negative  Hematological: Negative  Psychiatric/Behavioral: Negative  Historical Information   Past Medical History:   Diagnosis Date    Elevated PSA     Hyperlipidemia     Hypertension      History reviewed  No pertinent surgical history    Social History   History   Alcohol Use    Yes     Comment: SOCIAL      History   Drug Use No     History   Smoking Status    Never Smoker   Smokeless Tobacco    Never Used     Family History: non-contributory    Meds/Allergies   all medications and allergies reviewed  No Known Allergies    Objective First Vitals:   Blood Pressure: 154/81 (02/02/19 1121)  Pulse: 80 (02/02/19 1121)  Temperature: 98 °F (36 7 °C) (02/02/19 1121)  Temp Source: Oral (02/02/19 1121)  Respirations: 18 (02/02/19 1121)  Height: 5' 11" (180 3 cm) (02/02/19 1121)  Weight - Scale: 87 kg (191 lb 12 8 oz) (02/02/19 1121)  SpO2: 94 % (02/02/19 1121)    Current Vitals:   Blood Pressure: 154/81 (02/02/19 1121)  Pulse: 80 (02/02/19 1121)  Temperature: 98 °F (36 7 °C) (02/02/19 1121)  Temp Source: Oral (02/02/19 1121)  Respirations: 18 (02/02/19 1121)  Height: 5' 11" (180 3 cm) (02/02/19 1121)  Weight - Scale: 87 kg (191 lb 12 8 oz) (02/02/19 1121)  SpO2: 94 % (02/02/19 1121)      Intake/Output Summary (Last 24 hours) at 02/02/19 1219  Last data filed at 02/02/19 0804   Gross per 24 hour   Intake             1000 ml   Output              600 ml   Net              400 ml       Invasive Devices     Peripheral Intravenous Line            Peripheral IV 02/01/19 Left Wrist less than 1 day                Physical Exam   Constitutional: He is oriented to person, place, and time  He appears well-developed and well-nourished  HENT:   Head: Normocephalic and atraumatic  Eyes: Pupils are equal, round, and reactive to light  Cardiovascular: Normal rate and regular rhythm  Pulmonary/Chest: Effort normal  No respiratory distress  Abdominal: Soft  He exhibits no distension  There is tenderness  There is guarding  There is no rebound  Musculoskeletal: He exhibits no edema  Neurological: He is alert and oriented to person, place, and time  Skin: Skin is warm and dry  Psychiatric: He has a normal mood and affect  Lab Results:   I have personally reviewed pertinent lab results    , CBC:   Lab Results   Component Value Date    WBC 13 50 (H) 02/02/2019    HGB 15 5 02/02/2019    HCT 44 6 02/02/2019    MCV 90 02/02/2019     02/02/2019    MCH 31 3 02/02/2019    MCHC 34 8 02/02/2019    RDW 13 2 02/02/2019    MPV 8 5 (L) 02/02/2019    NRBC 0 02/02/2019   , CMP:   Lab Results   Component Value Date    SODIUM 137 02/02/2019    K 3 9 02/02/2019     02/02/2019    CO2 27 02/02/2019    BUN 11 02/02/2019    CREATININE 1 07 02/02/2019    CALCIUM 9 4 02/02/2019    AST 17 02/02/2019    ALT 26 02/02/2019    ALKPHOS 46 (L) 02/02/2019    EGFR 75 02/02/2019     Imaging: I have personally reviewed pertinent reports  EKG, Pathology, and Other Studies: I have personally reviewed pertinent reports        Code Status: Prior  Advance Directive and Living Will:      Power of :    POLST:

## 2019-02-02 NOTE — DISCHARGE SUMMARY
Discharge Summary - general surgery   Eliazar Shore 59 y o  male MRN: [id number]  Unit/Bed#: -01 Encounter: [id number]    Admission Date:  February 1, 2018  Admission Orders     Ordered        02/01/19 2259  Inpatient Admission  Once         02/01/19 2247  Place in Observation  Once               Admitting Diagnosis: Cholecystitis [K81 9]  Chest pain [R07 9]    HPI:  76-year-old with history hypertension, admitted with severe and worsening right upper quadrant/epigastric pain  Exam and imaging consistent with acute cholecystitis  Patient has had approximately 2 day history of pain since Thursday evening associated nausea  No known fevers or chills  No vomiting  No change in bowel bladder habits  Procedures Performed: No orders of the defined types were placed in this encounter  Hospital Course:  Patient was admitted placed on IV fluids, made NPO placed on IV antibiotics and with appropriate pain control  After reviewing the imaging has decided that he would need surgery which would consist a laparoscopic cholecystectomy, possible open  Given the imaging results of a extremely large and distended gallbladder I would expect this to be a very complex laparoscopic surgery and with high with a high likelihood of converting to an open procedure  Unfortunately there is not any additional advanced practitioner systems today in the operating room and therefore I feel it is safest for the patient to be transferred to [facility] where the appropriate resources are available  This was discussed with the patient's wife and the patient who agree with transfer  Significant Findings, Care, Treatment and Services Provided:  Acute cholecystitis treated with IV antibiotics will need surgical intervention    Lab Results:   I have personally reviewed pertinent lab results  , CBC:   Lab Results   Component Value Date    WBC 13 50 (H) 02/02/2019    HGB 15 5 02/02/2019    HCT 44 6 02/02/2019    MCV 90 02/02/2019     02/02/2019    MCH 31 3 02/02/2019    MCHC 34 8 02/02/2019    RDW 13 2 02/02/2019    MPV 8 5 (L) 02/02/2019    NRBC 0 02/02/2019   , CMP:   Lab Results   Component Value Date    SODIUM 137 02/02/2019    K 3 9 02/02/2019     02/02/2019    CO2 27 02/02/2019    BUN 11 02/02/2019    CREATININE 1 07 02/02/2019    CALCIUM 9 4 02/02/2019    AST 17 02/02/2019    ALT 26 02/02/2019    ALKPHOS 46 (L) 02/02/2019    EGFR 75 02/02/2019   , Magnesium: No components found for: MAG, Phosphorous: No results found for: PHOS    Complications: none    Discharge Diagnosis:  Acute cholecystitis    Resolved Problems  Date Reviewed: 2/1/2019    None          Condition at Discharge: stable         Discharge instructions/Information to patient and family:   See after visit summary for information provided to patient and family  Provisions for Follow-Up Care:  See after visit summary for information related to follow-up care and any pertinent home health orders  [facility]: [facility]    Planned Readmission: Yes    Discharge Statement   I spent 25 minutes discharging the patient  This time was spent on the day of discharge  I had direct contact with the patient on the day of discharge  Additional documentation is required if more than 30 minutes were spent on discharge  Discharge Medications:  See after visit summary for reconciled discharge medications provided to patient and family

## 2019-02-02 NOTE — SOCIAL WORK
Chart reviewed by case management, transfer order was written, pt needs to be transferred for higher level of care, we do not have the services that the pt needs, I did meet with the pt just before he was being transported to [facility] and he stated he spoke with his wife and he does agree with the transfer, cm was unable to complete an assessment, pt being transferred

## 2019-02-02 NOTE — ED NOTES
Pt  States his chest pain started after eating dinner last night  Chest pain has been constant since onset  Denies SOB        Donaldo Sidhu RN  02/01/19 9193

## 2019-02-02 NOTE — PLAN OF CARE
Problem: DISCHARGE PLANNING - CARE MANAGEMENT  Goal: Discharge to post-acute care or home with appropriate resources  INTERVENTIONS:  - Conduct assessment to determine patient/family and health care team treatment goals, and need for post-acute services based on payer coverage, community resources, and patient preferences, and barriers to discharge  - Address psychosocial, clinical, and financial barriers to discharge as identified in assessment in conjunction with the patient/family and health care team  - Arrange appropriate level of post-acute services according to patient's   needs and preference and payer coverage in collaboration with the physician and health care team  - Communicate with and update the patient/family, physician, and health care team regarding progress on the discharge plan  - Arrange appropriate transportation to post-acute venues     Pt transferred to [facility] for higher level of care  Outcome: Completed Date Met: 02/02/19

## 2019-02-02 NOTE — ANESTHESIA PREPROCEDURE EVALUATION
Review of Systems/Medical History  Patient summary reviewed  Chart reviewed  No history of anesthetic complications     Cardiovascular  Exercise tolerance (METS): >4,  No pacemaker/AICD, Hyperlipidemia, Hypertension , No CAD , No cardiac stents     Pulmonary  Not a smoker , No COPD , No asthma , No recent URI , No sleep apnea ,        GI/Hepatic            Endo/Other     GYN       Hematology   Musculoskeletal       Neurology    No TIA, No CVA ,    Psychology           Physical Exam    Airway    Mallampati score: III  TM Distance: >3 FB       Dental   implants,     Cardiovascular      Pulmonary      Other Findings  Multiple caps    Lab Results   Component Value Date    WBC 13 50 (H) 02/02/2019    HGB 15 5 02/02/2019     02/02/2019     Lab Results   Component Value Date     12/02/2015    K 3 9 02/02/2019    BUN 11 02/02/2019    CREATININE 1 07 02/02/2019    GLUCOSE 95 12/02/2015     No results found for: PTT   No results found for: INR        Lab Results   Component Value Date    HGBA1C 5 0 04/03/2015         Anesthesia Plan  ASA Score- 2     Anesthesia Type- general with ASA Monitors  Additional Monitors:   Airway Plan:     Comment:  PAUL Rich , have personally seen and evaluated the patient prior to anesthetic care  I have reviewed the pre-anesthetic record, and other medical records if appropriate to the anesthetic care  If a CRNA is involved in the case, I have reviewed the CRNA assessment, if present, and agree  Risks/benefits and alternatives discussed with patient including possible PONV, sore throat, and possibility of rare anesthetic and surgical emergencies        Plan Factors-    Induction- intravenous  Postoperative Plan-     Informed Consent- Anesthetic plan and risks discussed with patient

## 2019-02-02 NOTE — ED PROVIDER NOTES
History  Chief Complaint   Patient presents with    Chest Pain     chest pain since last night following dinner  states "feels like reflux" chest pain from epigastric area to top of central chest   denies other radiating factors, or SOB  states he became diaphoretic at home earlier tonight     Nausea     nausea only no emesis      Patient is 78-year-old male who started of pain in his epigastric area after dinner yesterday  Pain has persisted for the past 24 hours  Patient describes the pain as an ache in the center of his epigastric area and radiates up toward his chest   Patient denies any vomiting  Patient states he has not eaten since yesterday evening because he has no appetite  He denied any diarrhea  Denied any fever or chills  Denied any shortness of breath  Patient did report 1 episode of diaphoresis patient states nothing makes the pain better and nothing makes it worse H suspect constant and worsening with time over the past 24 hours            Prior to Admission Medications   Prescriptions Last Dose Informant Patient Reported? Taking?    Omega-3 Fatty Acids (FISH OIL) 1,000 mg 2/1/2019 at Unknown time Self Yes Yes   Sig: Take 1 capsule by mouth 2 (two) times a day     acetaminophen-codeine (TYLENOL with CODEINE #3) 300-30 MG per tablet More than a month at Unknown time Self Yes No   Sig: Take by mouth   amLODIPine (NORVASC) 5 mg tablet 2/1/2019 at Unknown time Self No Yes   Sig: Take 1 tablet (5 mg total) by mouth daily   aspirin 81 MG tablet 2/1/2019 at Unknown time Self Yes Yes   Sig: Take by mouth   enalapril (VASOTEC) 10 mg tablet 2/1/2019 at Unknown time Self No Yes   Sig: Take 1 tablet (10 mg total) by mouth 2 (two) times a day   rosuvastatin (CRESTOR) 5 mg tablet 2/1/2019 at Unknown time Self No Yes   Sig: Take 1 tablet (5 mg total) by mouth daily      Facility-Administered Medications: None       Past Medical History:   Diagnosis Date    Elevated PSA     Hyperlipidemia     Hypertension        History reviewed  No pertinent surgical history  Family History   Problem Relation Age of Onset    Hypertension Mother     Heart disease Father         CARDIAC DISORDER    Hypertension Father     Coronary artery disease Family     Hyperlipidemia Family     Osteoarthritis Family     Peripheral vascular disease Family      I have reviewed and agree with the history as documented  Social History   Substance Use Topics    Smoking status: Never Smoker    Smokeless tobacco: Never Used    Alcohol use Yes      Comment: SOCIAL         Review of Systems   Constitutional: Positive for appetite change and diaphoresis  Negative for fever  HENT: Negative  Respiratory: Negative  Cardiovascular: Negative  Gastrointestinal: Positive for abdominal pain  Negative for diarrhea and vomiting  Genitourinary: Negative  Musculoskeletal: Negative  Skin: Negative  Neurological: Negative  All other systems reviewed and are negative  Physical Exam  Physical Exam   Constitutional: He is oriented to person, place, and time  He appears well-developed and well-nourished  In obvious discomfort but not distressed   HENT:   Head: Normocephalic and atraumatic  Eyes: Conjunctivae are normal  No scleral icterus  Cardiovascular: Normal rate and regular rhythm  Pulmonary/Chest: Effort normal and breath sounds normal    Abdominal: Soft  There is tenderness  Mild epigastric tenderness  No pulsatile mass   Musculoskeletal: Normal range of motion  He exhibits no tenderness  Neurological: He is alert and oriented to person, place, and time  Skin: Skin is warm and dry  Psychiatric: He has a normal mood and affect  His behavior is normal    Nursing note and vitals reviewed        Vital Signs  ED Triage Vitals   Temperature Pulse Respirations Blood Pressure SpO2   02/01/19 1952 02/01/19 1952 02/01/19 1952 02/01/19 1952 02/01/19 1952   97 9 °F (36 6 °C) 83 18 (!) 180/91 98 % Temp Source Heart Rate Source Patient Position - Orthostatic VS BP Location FiO2 (%)   02/01/19 1952 02/01/19 2015 02/01/19 1952 02/01/19 1952 --   Temporal Monitor Lying Right arm       Pain Score       02/01/19 1952       7           Vitals:    02/01/19 1952 02/01/19 2015 02/01/19 2102   BP: (!) 180/91 155/93 140/55   Pulse: 83 78 77   Patient Position - Orthostatic VS: Lying Lying Lying       Visual Acuity      ED Medications  Medications   lactated ringers infusion (not administered)   ondansetron (ZOFRAN) injection 4 mg (not administered)   ciprofloxacin (CIPRO) IVPB (premix) 400 mg (not administered)   metroNIDAZOLE (FLAGYL) IVPB (premix) 500 mg (not administered)   morphine injection 2 mg (not administered)   acetaminophen (TYLENOL) tablet 650 mg (not administered)   pantoprazole (PROTONIX) injection 40 mg (40 mg Intravenous Given 2/1/19 2058)   morphine injection 2 mg (2 mg Intravenous Given 2/1/19 2057)   iohexol (OMNIPAQUE) 350 MG/ML injection (SINGLE-DOSE) 100 mL (100 mL Intravenous Given 2/1/19 2046)   ketorolac (TORADOL) injection 30 mg (30 mg Intravenous Given 2/1/19 2157)   ondansetron (ZOFRAN) injection 4 mg (4 mg Intravenous Given 2/1/19 2157)       Diagnostic Studies  Results Reviewed     Procedure Component Value Units Date/Time    Lipase [[id number]]  (Normal) Collected:  02/01/19 2028    Lab Status:  Final result Specimen:  Blood Updated:  02/01/19 2039     Lipase 28 u/L     Troponin I [[id number]]  (Normal) Collected:  02/01/19 1956    Lab Status:  Final result Specimen:  Blood from Arm, Left Updated:  02/01/19 2031     Troponin I <0 03 ng/mL     Comprehensive metabolic panel [[id number]]  (Abnormal) Collected:  02/01/19 1956    Lab Status:  Final result Specimen:  Blood from Arm, Left Updated:  02/01/19 2026     Sodium 135 mmol/L      Potassium 4 0 mmol/L      Chloride 101 mmol/L      CO2 24 mmol/L      ANION GAP 10 mmol/L      BUN 10 mg/dL      Creatinine 1 01 mg/dL      Glucose 120 (H) mg/dL Calcium 9 7 mg/dL      AST 19 U/L      ALT 29 U/L      Alkaline Phosphatase 49 (L) U/L      Total Protein 7 3 g/dL      Albumin 4 7 g/dL      Total Bilirubin 1 20 (H) mg/dL      eGFR 80 ml/min/1 73sq m     Narrative:         National Kidney Disease Education Program recommendations are as follows:  GFR calculation is accurate only with a steady state creatinine  Chronic Kidney disease less than 60 ml/min/1 73 sq  meters  Kidney failure less than 15 ml/min/1 73 sq  meters  CBC and differential [[id number]]  (Abnormal) Collected:  02/01/19 1956    Lab Status:  Final result Specimen:  Blood from Arm, Left Updated:  02/01/19 2011     WBC 12 80 (H) Thousand/uL      RBC 5 19 Million/uL      Hemoglobin 15 8 g/dL      Hematocrit 46 5 %      MCV 90 fL      MCH 30 6 pg      MCHC 34 1 g/dL      RDW 13 2 %      MPV 8 1 (L) fL      Platelets 267 Thousands/uL      nRBC 0 /100 WBCs      Neutrophils Relative 78 (H) %      Lymphocytes Relative 13 (L) %      Monocytes Relative 8 %      Eosinophils Relative 0 %      Basophils Relative 0 %      Neutrophils Absolute 10 00 (H) Thousands/µL      Lymphocytes Absolute 1 70 Thousands/µL      Monocytes Absolute 1 00 Thousand/µL      Eosinophils Absolute 0 00 Thousand/µL      Basophils Absolute 0 00 Thousands/µL                  US right upper quadrant   Final Result by Sissy Hirsch MD (02/01 2253)      The gallbladder is markedly distended  There is cholelithiasis with findings suspicious for acute cholecystitis, as described above  Please see discussion  Surgical consultation is recommended  If further imaging is necessary, nuclear medicine    hepatobiliary imaging could be performed               I personally discussed this study with Erwin Devi on 2/1/2019 at 10:46 PM                Workstation performed: [id number]         CT abdomen pelvis with contrast   Final Result by Jaquan Beltre MD (02/01 2055)      Markedly distended gallbladder with multiple calculi noted towards the fundus  One stone is in the neck as seen on image 5/110 measuring 9 mm  The gallbladder measures approximately 19 cm in diameter (normal less than 10 cm)  Although there is no    surrounding inflammation, this is almost certainly the etiology for the patient's pain and surgical consultation is recommended  Borderline splenomegaly  Indeterminate left adrenal gland nodule measuring 2 0 cm  Nonemergent adrenal gland protocol CT scan recommended for further characterization  Workstation performed: [id number]                    Procedures  Procedures       Phone Contacts  ED Phone Contact    ED Course                         Initial Sepsis Screening     [address] Name 02/01/19 8712                Is the patient's history suggestive of a new or worsening infection? (!)  Yes (Proceed)  -TD        Suspected source of infection acute abdominal infection  -TD        Are two or more of the following signs & symptoms of infection both present and new to the patient? No  -TD        Indicate SIRS criteria  --        If the answer is yes to both questions, suspicion of sepsis is present  --        If severe sepsis is present AND tissue hypoperfusion perists in the hour after fluid resuscitation or lactate > 4, the patient meets criteria for SEPTIC SHOCK  --        Are any of the following organ dysfunction criteria present within 6 hours of suspected infection and SIRS criteria that are NOT considered to be chronic conditions?   --        Organ dysfunction  --        Date of presentation of severe sepsis  --        Time of presentation of severe sepsis  --        Tissue hypoperfusion persists in the hour after crystalloid fluid administration, evidenced, by either:  --        Was hypotension present within one hour of the conclusion of crystalloid fluid administration?  --        Date of presentation of septic shock  --        Time of presentation of septic shock  --          User Key  (r) = Recorded By, (t) = Taken By, (c) = Cosigned By    Initials Name Provider Type    GHASSAN Tellez DO Physician                  MDM  Number of Diagnoses or Management Options  Diagnosis management comments: Consultation with surgery  Patient given pain medication and became comfortable  Patient admitted to the surgical service in stable condition       Amount and/or Complexity of Data Reviewed  Clinical lab tests: reviewed  Tests in the radiology section of CPT®: reviewed    Risk of Complications, Morbidity, and/or Mortality  Presenting problems: moderate  Diagnostic procedures: moderate  Management options: low    Patient Progress  Patient progress: stable      Disposition  Final diagnoses:   None     ED Disposition     None      Follow-up Information    None         Patient's Medications   Discharge Prescriptions    No medications on file     No discharge procedures on file      ED Provider  Electronically Signed by           Ashlyn Shields MD  02/01/19 9423       Ashlyn Shields MD  02/01/19 1818

## 2019-02-02 NOTE — H&P
H&P Exam - General Surgery   Laura Arriaga 59 y o  male MRN: [id number]  Unit/Bed#: -01 Encounter: [id number]    Assessment/Plan     Assessment:  Pt is a 59y o  M with acute cholecystitis     Plan:  Admit to surgery service  NPO/IVF  Ancef/flagyl  Prn pain control  Monitor LFTs  OR for lap skye, possible open, with IOC     History of Present Illness     HPI:  aLura Arriaga is a 59 y o  male who presents with pmh HTN, HLD, no past surgeries, he presents with 3 days of RUQ abdominal pain  This has happened once in the past 2 weeks ago and resolved on its own  It is sharp and constant  No N/V, no diarrhea, he had a fever to 100 3 at the OSH where he was admitted for a day on cipro/flagyl  He has never noticed changes in his skin or stool color  He does not get the pain with fried or fatty food  CT scan shows a 19cm gallbladder with a stone in the neck  He has a mildly elevated T bili or 1 3 and WBC 13 5  Review of Systems   Constitutional: Positive for appetite change and fever  Negative for activity change  HENT: Negative  Eyes: Negative  Respiratory: Negative for chest tightness and shortness of breath  Cardiovascular: Negative  Gastrointestinal: Positive for abdominal pain and constipation  Negative for abdominal distention, diarrhea, nausea and vomiting  Genitourinary: Negative  Musculoskeletal: Negative  Skin: Negative  Neurological: Negative  Psychiatric/Behavioral: Negative  Historical Information   Past Medical History:   Diagnosis Date    Elevated PSA     Hyperlipidemia     Hypertension      History reviewed  No pertinent surgical history    Social History   History   Alcohol Use    Yes     Comment: SOCIAL      History   Drug Use No     History   Smoking Status    Never Smoker   Smokeless Tobacco    Never Used     Family History: non-contributory    Meds/Allergies   all medications and allergies reviewed  No Known Allergies    Objective   First Vitals: Blood Pressure: (!) 180/91 (02/01/19 1952)  Pulse: 83 (02/01/19 1952)  Temperature: 97 9 °F (36 6 °C) (02/01/19 1952)  Temp Source: Temporal (02/01/19 1952)  Respirations: 18 (02/01/19 1952)  Height: 5' 11" (180 3 cm) (02/01/19 1950)  Weight - Scale: 90 7 kg (200 lb) (02/01/19 1950)  SpO2: 98 % (02/01/19 1952)    Current Vitals:   Blood Pressure: 147/84 (02/02/19 0734)  Pulse: 78 (02/02/19 0734)  Temperature: 100 3 °F (37 9 °C) (02/02/19 0734)  Temp Source: Tympanic (02/02/19 0734)  Respirations: 18 (02/02/19 0734)  Height: 5' 11" (180 3 cm) (02/01/19 2352)  Weight - Scale: 87 kg (191 lb 14 4 oz) (02/01/19 2352)  SpO2: 93 % (02/02/19 0734)      Intake/Output Summary (Last 24 hours) at 02/02/19 1153  Last data filed at 02/02/19 0804   Gross per 24 hour   Intake             1000 ml   Output              600 ml   Net              400 ml       Invasive Devices     Peripheral Intravenous Line            Peripheral IV 02/01/19 Left Wrist less than 1 day                Physical Exam   Constitutional: He is oriented to person, place, and time  He appears well-developed and well-nourished  HENT:   Head: Normocephalic and atraumatic  Eyes: Pupils are equal, round, and reactive to light  Neck: Normal range of motion  Cardiovascular: Normal rate and regular rhythm  Pulmonary/Chest: Effort normal  No respiratory distress  Abdominal: Soft  He exhibits no distension  There is tenderness  RUQ abdominal pain with guarding   Musculoskeletal: He exhibits no edema  Neurological: He is alert and oriented to person, place, and time  Skin: Skin is warm and dry  Psychiatric: He has a normal mood and affect  Lab Results:   I have personally reviewed pertinent lab results    , CBC:   Lab Results   Component Value Date    WBC 13 50 (H) 02/02/2019    HGB 15 5 02/02/2019    HCT 44 6 02/02/2019    MCV 90 02/02/2019     02/02/2019    MCH 31 3 02/02/2019    MCHC 34 8 02/02/2019    RDW 13 2 02/02/2019    MPV 8 5 (L) 02/02/2019    NRBC 0 02/02/2019   , CMP:   Lab Results   Component Value Date    SODIUM 137 02/02/2019    K 3 9 02/02/2019     02/02/2019    CO2 27 02/02/2019    BUN 11 02/02/2019    CREATININE 1 07 02/02/2019    CALCIUM 9 4 02/02/2019    AST 17 02/02/2019    ALT 26 02/02/2019    ALKPHOS 46 (L) 02/02/2019    EGFR 75 02/02/2019     Imaging: I have personally reviewed pertinent reports  EKG, Pathology, and Other Studies: I have personally reviewed pertinent reports        Code Status: Prior  Advance Directive and Living Will:      Power of :    POLST:

## 2019-02-03 LAB
ALBUMIN SERPL BCP-MCNC: 2.9 G/DL (ref 3.5–5)
ALP SERPL-CCNC: 58 U/L (ref 46–116)
ALT SERPL W P-5'-P-CCNC: 101 U/L (ref 12–78)
ANION GAP SERPL CALCULATED.3IONS-SCNC: 7 MMOL/L (ref 4–13)
AST SERPL W P-5'-P-CCNC: 91 U/L (ref 5–45)
ATRIAL RATE: 86 BPM
BASOPHILS # BLD AUTO: 0.02 THOUSANDS/ΜL (ref 0–0.1)
BASOPHILS NFR BLD AUTO: 0 % (ref 0–1)
BILIRUB SERPL-MCNC: 0.81 MG/DL (ref 0.2–1)
BUN SERPL-MCNC: 18 MG/DL (ref 5–25)
CALCIUM SERPL-MCNC: 8.5 MG/DL (ref 8.3–10.1)
CHLORIDE SERPL-SCNC: 107 MMOL/L (ref 100–108)
CO2 SERPL-SCNC: 24 MMOL/L (ref 21–32)
CREAT SERPL-MCNC: 1.02 MG/DL (ref 0.6–1.3)
EOSINOPHIL # BLD AUTO: 0 THOUSAND/ΜL (ref 0–0.61)
EOSINOPHIL NFR BLD AUTO: 0 % (ref 0–6)
ERYTHROCYTE [DISTWIDTH] IN BLOOD BY AUTOMATED COUNT: 12.7 % (ref 11.6–15.1)
GFR SERPL CREATININE-BSD FRML MDRD: 80 ML/MIN/1.73SQ M
GLUCOSE SERPL-MCNC: 120 MG/DL (ref 65–140)
HCT VFR BLD AUTO: 37.8 % (ref 36.5–49.3)
HGB BLD-MCNC: 13 G/DL (ref 12–17)
IMM GRANULOCYTES # BLD AUTO: 0.25 THOUSAND/UL (ref 0–0.2)
IMM GRANULOCYTES NFR BLD AUTO: 2 % (ref 0–2)
LYMPHOCYTES # BLD AUTO: 0.77 THOUSANDS/ΜL (ref 0.6–4.47)
LYMPHOCYTES NFR BLD AUTO: 5 % (ref 14–44)
MCH RBC QN AUTO: 31.3 PG (ref 26.8–34.3)
MCHC RBC AUTO-ENTMCNC: 34.4 G/DL (ref 31.4–37.4)
MCV RBC AUTO: 91 FL (ref 82–98)
MONOCYTES # BLD AUTO: 0.85 THOUSAND/ΜL (ref 0.17–1.22)
MONOCYTES NFR BLD AUTO: 5 % (ref 4–12)
NEUTROPHILS # BLD AUTO: 15.04 THOUSANDS/ΜL (ref 1.85–7.62)
NEUTS SEG NFR BLD AUTO: 88 % (ref 43–75)
NRBC BLD AUTO-RTO: 0 /100 WBCS
PLATELET # BLD AUTO: 246 THOUSANDS/UL (ref 149–390)
PMV BLD AUTO: 10.1 FL (ref 8.9–12.7)
POTASSIUM SERPL-SCNC: 4.1 MMOL/L (ref 3.5–5.3)
PR INTERVAL: 146 MS
PROT SERPL-MCNC: 6.3 G/DL (ref 6.4–8.2)
QRS AXIS: 186 DEGREES
QRSD INTERVAL: 78 MS
QT INTERVAL: 358 MS
QTC INTERVAL: 428 MS
RBC # BLD AUTO: 4.16 MILLION/UL (ref 3.88–5.62)
SODIUM SERPL-SCNC: 138 MMOL/L (ref 136–145)
T WAVE AXIS: 160 DEGREES
VENTRICULAR RATE: 86 BPM
WBC # BLD AUTO: 16.93 THOUSAND/UL (ref 4.31–10.16)

## 2019-02-03 PROCEDURE — 93010 ELECTROCARDIOGRAM REPORT: CPT | Performed by: INTERNAL MEDICINE

## 2019-02-03 PROCEDURE — 80053 COMPREHEN METABOLIC PANEL: CPT | Performed by: STUDENT IN AN ORGANIZED HEALTH CARE EDUCATION/TRAINING PROGRAM

## 2019-02-03 PROCEDURE — 99024 POSTOP FOLLOW-UP VISIT: CPT | Performed by: SURGERY

## 2019-02-03 PROCEDURE — 85025 COMPLETE CBC W/AUTO DIFF WBC: CPT | Performed by: STUDENT IN AN ORGANIZED HEALTH CARE EDUCATION/TRAINING PROGRAM

## 2019-02-03 RX ORDER — METRONIDAZOLE 500 MG/1
500 TABLET ORAL EVERY 8 HOURS SCHEDULED
Status: DISCONTINUED | OUTPATIENT
Start: 2019-02-03 | End: 2019-02-04

## 2019-02-03 RX ADMIN — METRONIDAZOLE 500 MG: 500 TABLET ORAL at 16:28

## 2019-02-03 RX ADMIN — AMLODIPINE BESYLATE 5 MG: 5 TABLET ORAL at 09:18

## 2019-02-03 RX ADMIN — HEPARIN SODIUM 5000 UNITS: 5000 INJECTION INTRAVENOUS; SUBCUTANEOUS at 13:58

## 2019-02-03 RX ADMIN — CEFAZOLIN SODIUM 1000 MG: 10 INJECTION, POWDER, FOR SOLUTION INTRAVENOUS at 21:36

## 2019-02-03 RX ADMIN — OXYCODONE HYDROCHLORIDE 5 MG: 5 TABLET ORAL at 16:30

## 2019-02-03 RX ADMIN — METRONIDAZOLE 500 MG: 500 TABLET ORAL at 21:36

## 2019-02-03 RX ADMIN — METRONIDAZOLE 500 MG: 500 INJECTION, SOLUTION INTRAVENOUS at 09:17

## 2019-02-03 RX ADMIN — CEFAZOLIN SODIUM 1000 MG: 10 INJECTION, POWDER, FOR SOLUTION INTRAVENOUS at 13:58

## 2019-02-03 RX ADMIN — OXYCODONE HYDROCHLORIDE 5 MG: 5 TABLET ORAL at 03:23

## 2019-02-03 RX ADMIN — CEFAZOLIN SODIUM 1000 MG: 10 INJECTION, POWDER, FOR SOLUTION INTRAVENOUS at 05:20

## 2019-02-03 RX ADMIN — OXYCODONE HYDROCHLORIDE 5 MG: 5 TABLET ORAL at 10:09

## 2019-02-03 RX ADMIN — HEPARIN SODIUM 5000 UNITS: 5000 INJECTION INTRAVENOUS; SUBCUTANEOUS at 05:20

## 2019-02-03 RX ADMIN — METRONIDAZOLE 500 MG: 500 INJECTION, SOLUTION INTRAVENOUS at 00:42

## 2019-02-03 RX ADMIN — HEPARIN SODIUM 5000 UNITS: 5000 INJECTION INTRAVENOUS; SUBCUTANEOUS at 21:36

## 2019-02-03 NOTE — SOCIAL WORK
Cm met with patient to review will is cm  Patient presented as alert during conversation  Patient reports living with wife Tj sheets [phone], in a bilevel home with 3 steps to enter with rails and a first-floor setup  Patient independent prior to admission  Patient denied having any inpatient PT/OT, inpatient MH, substance abuse treatment or [facility] services  Patient denied having any DME in the home  Patient's pharmacy of choice is [facility] in [facility]  Patient's PCP is affiliated with [address] [place], [place] [address] in [facility] [place] [place]  Patient is currently employed and driving independently  CM reviewed d/c planning process including the following: identifying help at home, patient preference for d/c planning needs, Discharge Lounge, [facility] Meds to Bed program, availability of treatment team to discuss questions or concerns patient and/or family may have regarding understanding medications and recognizing signs and symptoms once discharged  CM also encouraged patient to follow up with all recommended appointments after discharge  Patient advised of importance for patient and family to participate in managing patients medical well being

## 2019-02-03 NOTE — PROGRESS NOTES
The metronidazole has / have been converted to Oral per [facility] IV-to-PO Auto-Conversion Protocol for Adults as approved by the Pharmacy and Therapeutics Committee  The patient met all eligible criteria:  3 Age = 23years old   2) Received at least one dose of the IV form   3) Receiving at least one other scheduled oral/enteral medication   4) Tolerating an oral/enteral diet   and did not have any exclusions:   1) Critical care patient   2) Active GI bleed (IF assessing H2RAs or PPIs)   3) Continuous tube feeding (IF assessing cipro, doxycycline, levofloxacin, minocycline, rifampin, or voriconazole)   4) Receiving PO vancomycin (IF assessing metronidazole)   5) Persistent nausea and/or vomiting   6) Ileus or gastrointestinal obstruction   7) Karson/nasogastric tube set for continuous suction   8) Specific order not to automatically convert to PO (in the order's comments or if discussed in the most recent Infectious Disease or primary team's progress notes)

## 2019-02-03 NOTE — RESPIRATORY THERAPY NOTE
RT Protocol Note  Pita Smith 59 y o  male MRN: [id number]  Unit/Bed#: [facility] 823-01 Encounter: [id number]    Assessment    Principal Problem:    Cholecystitis      Home Pulmonary Medications:  None listed       Past Medical History:   Diagnosis Date    Elevated PSA     Hyperlipidemia     Hypertension      Social History     Social History    Marital status: /Civil Union     Spouse name: N/A    Number of children: N/A    Years of education: N/A     Social History Main Topics    Smoking status: Never Smoker    Smokeless tobacco: Never Used    Alcohol use Yes      Comment: SOCIAL     Drug use: No    Sexual activity: Not Asked     Other Topics Concern    None     Social History Narrative    None       Subjective         Objective    Physical Exam:   Assessment Type: (P) Assess only  General Appearance: (P) Alert, Awake  Respiratory Pattern: (P) Normal  Chest Assessment: (P) Chest expansion symmetrical  Bilateral Breath Sounds: (P) Clear    Vitals:  Blood pressure 118/67, pulse 78, temperature 98 7 °F (37 1 °C), temperature source Oral, resp  rate 20, height 5' 11" (1 803 m), weight 87 kg (191 lb 12 8 oz), SpO2 92 %  Imaging and other studies: I have personally reviewed pertinent reports  Plan    Respiratory Plan: No distress/Pulmonary history, Discontinue Protocol        Resp Comments: (P) pt assessed at this time for respiratory protocol  pt has no pulmonary history and does not take any pulmonary medication  pt bs are clear  pt was extubated  after a procedure in OR   protocol dc'd at this time

## 2019-02-03 NOTE — PROGRESS NOTES
Progress Note - General Surgery   Chidi Vidal 59 y o  male MRN: [id number]  Unit/Bed#: [facility] 823-01 Encounter: [id number]    Assessment:  Pt is a 59y o  M with acute cholecystitis s/p lap syke 2/3  AMARJIT serosang    Plan:  Regular diet  D/c IVF  Ancef/flagyl  Continue AMARJIT drain  Prn pain control    Subjective/Objective   Chief Complaint:     Subjective: NENA  Pain much better  No N/V  Tolerating clears  Objective:     Blood pressure 122/66, pulse 74, temperature 97 7 °F (36 5 °C), temperature source Oral, resp  rate 20, height 5' 11" (1 803 m), weight 88 6 kg (195 lb 5 2 oz), SpO2 90 %  ,Body mass index is 27 24 kg/m²  Intake/Output Summary (Last 24 hours) at 02/03/19 0537  Last data filed at 02/03/19 4941   Gross per 24 hour   Intake             4530 ml   Output              855 ml   Net             3675 ml       Invasive Devices     Peripheral Intravenous Line            Peripheral IV 02/01/19 Left Wrist 1 day    Peripheral IV 02/02/19 Left Hand less than 1 day    Peripheral IV 02/02/19 Right Antecubital less than 1 day          Drain            Closed/Suction Drain Right Abdomen Bulb less than 1 day                Physical Exam: NAD  AAOX3  Normal respiratory effort  Soft, TTP RUQ, ND  incisions c/d/i umbilical incisions with small ecchymosis   AMARJIT serosang  No c/c/e      Lab, Imaging and other studies: [facility]   VTE Pharmacologic Prophylaxis: Heparin  VTE Mechanical Prophylaxis: sequential compression device

## 2019-02-03 NOTE — UTILIZATION REVIEW
Initial Clinical Review    Admission: Date/Time/Statement: N/A @ N/A   Orders Placed This Encounter   Procedures    Place in Observation     Standing Status:   Standing     Number of Occurrences:   1     Order Specific Question:   Admitting Physician     Answer:   Juana Hua     Order Specific Question:   Level of Care     Answer:   Med Surg [16]     ED: Date/Time/Mode of Arrival:   ED Arrival Information     Expected Arrival Acuity Means of Arrival Escorted By Service Admission Type    2/2/2019 2/2/2019 11:15 [facility]FFILIATED WITH [facility] Surgery-General Emergency    Arrival Complaint    abdominal pain        Chief Complaint:   Chief Complaint   Patient presents with    Abdominal Pain     Patient presents to ER with abdominal pain and reported need for gall bladder sx  Patient is a transfer from [facility]  History of [address]  Patient was admitted placed on IV fluids, made NPO placed on IV antibiotics and with appropriate pain control  After reviewing the imaging has decided that he would need surgery which would consist a laparoscopic cholecystectomy, possible open  Given the imaging results of a extremely large and distended gallbladder I would expect this to be a very complex laparoscopic surgery and with high with a high likelihood of converting to an open procedure  Unfortunately there is not any additional advanced practitioner systems today in the operating room and therefore I feel it is safest for the patient to be transferred to [facility] where the appropriate resources are available       ED Vital Signs:   ED Triage Vitals [02/02/19 1121]   Temperature Pulse Respirations Blood Pressure SpO2   98 °F (36 7 °C) 80 18 154/81 94 %      Temp Source Heart Rate Source Patient Position - Orthostatic VS BP Location FiO2 (%)   Oral Monitor Lying Left arm --      Pain Score       No Pain        Wt Readings from Last 1 Encounters:   02/03/19 88 6 kg (195 lb 5 2 oz)     Vital Signs (abnormal):     Pertinent Labs/Diagnostic Test Results:     ED Treatment:   Medication Administration from 02/02/2019 1115 to 02/02/2019 1244       Date/Time Order Dose Route Action Action by Comments     02/02/2019 1236 sodium chloride 0 9 % infusion 100 mL/hr Intravenous New Bag Jad Dan RN         Past Medical/Surgical History:    Active Ambulatory Problems     Diagnosis Date Noted    Benign prostatic hyperplasia 08/12/2013    Carotid artery stenosis 10/10/2012    Elevated PSA 06/08/2016    Hyperglycemia 09/24/2014    Mixed hyperlipidemia 10/10/2012    Hypertension 10/10/2012    Dysphagia 01/23/2019    Acute cholecystitis due to biliary calculus 02/01/2019     Resolved Ambulatory Problems     Diagnosis Date Noted    No Resolved Ambulatory Problems     Past Medical History:   Diagnosis Date    Elevated PSA     Hyperlipidemia     Hypertension      Admitting Diagnosis: Abdominal pain [R10 9]  Age/Sex: 59 y o  male  Assessment/Plan:   Pt is a 59y o  M with acute cholecystitis   Plan:  Admit to surgery service  NPO/IVF  Ancef/flagyl  Prn pain control  Monitor LFTs  OR for lap diana, possible open, with IOC  TO OR FOR LAP DIANA  Admission Orders:  MED SURG  PT/OT EVAL & [address]  Scheduled Meds:   Current Facility-Administered Medications:  acetaminophen 650 mg Oral Q6H PRN Zaira Escobedo MD    amLODIPine 5 mg Oral Daily Zaira Escobedo MD    cefazolin 1,000 mg Intravenous Q8H Lucita Amador Last Rate: Stopped (02/03/19 0607)   diphenhydrAMINE 25 mg Intravenous Q6H PRN Lucita Amador    docusate sodium 100 mg Oral BID PRN Lucita Amador    heparin (porcine) 5,000 Units Subcutaneous Q8H [facility] Lucita Amador    HYDROmorphone 0 5 mg Intravenous Q3H PRN Zaira Escobedo MD    metroNIDAZOLE 500 mg Intravenous Q8H Lucita Amador Last Rate: Stopped (02/03/19 0120)   ondansetron 4 mg Intravenous Q4H PRN Lucita Amador    oxyCODONE 10 mg Oral Q4H PRN Srinivasa Ladd Serena Chao MD    oxyCODONE 5 mg Oral Q4H PRN Ángel Bello MD    pneumococcal 13-valent conjugate vaccine 0 5 mL Intramuscular Prior to discharge Ervin Palmer DO      Continuous Infusions:    PRN Meds:   acetaminophen    diphenhydrAMINE    docusate sodium    HYDROmorphone    ondansetron    oxyCODONE    oxyCODONE    pneumococcal 13-valent conjugate vaccine

## 2019-02-03 NOTE — OP NOTE
OPERATIVE REPORT  PATIENT NAME: Ashly Mcdonald    :  1960  MRN: [id number]  Pt Location: BE OR ROOM 07    SURGERY DATE: 2019    Surgeon(s) and Role:     * Hakeem Rush DO - Aleksandra Zazueta MD - Assisting    Preop Diagnosis:  Cholecystitis [K81 9]    Post-Op Diagnosis Codes:     * Cholecystitis [K81 9]    Procedure(s) (LRB):  CHOLECYSTECTOMY LAPAROSCOPIC with IOC (N/A)  CHOLANGIOGRAM (N/A)    Specimen(s):  ID Type Source Tests Collected by Time Destination   1 :  Tissue Gallbladder TISSUE EXAM Hakeem Rush DO 2019 1436        Estimated Blood Loss:   Minimal    Drains:  Closed/Suction Drain Right Abdomen Bulb (Active)   Site Description Healing;Leaking at site 2/3/2019 10:00 AM   Dressing Status Clean;Dry; Intact 2/3/2019 10:00 AM   Drainage Appearance Serosanguineous 2/3/2019 10:00 AM   Status To bulb suction 2/3/2019 10:00 AM   Output (mL) 40 mL 2/3/2019 10:00 AM   Number of days: 1       Anesthesia Type:   General    Operative Indications:  Cholecystitis [K81 9]      Operative Findings:  Massively distended gallbladder, measuring approximately 20 cm, cholelithiasis, patchy areas of jaqueline necrosis of the gallbladder    Complications:   None    Procedure and Technique:  Patient was identified in preoperative holding and then brought the operating suite placed under general anesthesia by the anesthesia department  His entire abdomen was prepped and draped in usual sterile fashion  Preoperative antibiotics and a nasogastric tube was placed  At this time all members of the team stopped and a time-out was performed  Everyone was in agreement with the plan  Next Marcaine 0 5% was infiltrated in the infraumbilical region  A 1 5 cm incision was made  The subcutaneous tissue and fascia was divided  Two 2-0 Vicryl stay sutures were placed in the fascia  The peritoneum was identified and bluntly entered  11 mm trocar was placed in the abdomen    The abdomen was insufflated with CO2  The camera was placed in the abdomen  The gallbladder was identified  It was markedly distended and enlarged  There was also multiple patchy areas of green jaqueline necrosis of the gallbladder  This time 3 additional 5 mm ports were placed  The 1st was in the subxiphoid region and the 2nd and 3rd when the right upper quadrant  Because the gallbladder was so distended it was unable to be grasped and directed in a cephalad and medial direction  Because of this, the gallbladder was decompressed  The peritoneum was then slowly dissected off the gallbladder  There was also a thick line on the gallbladder  We were however able to get a good critical view  This time we decided to attempt a cholangiogram   Using the Sharif clamp, the needle was inserted into the cystic duct as we are unable to get the Sharif clamp completely across the gallbladder due to its marked distension  As suspected, it appeared that the cystic duct was completely occluded and we were unable to successfully opacify the common bile duct  This time the intraoperative cholangiogram was aborted  Next the cystic artery was slowly dissected free as well  Three surgical staples were placed across the cystic artery and cystic duct and was divided between the distal 2nd and 3rd clips  The gallbladder was massive and primarily intrahepatic  It was extremely tedious dissection to remove the gallbladder from the liver bed  This was done using electrocautery  Ultimately was fully removed and placed in an Endo-Catch bag  The gallbladder fossa was inspected  There was some bleeding due to the marked amount of inflammation  This was able to be controlled using electrocautery  There was no evidence of any bile leak  A Segundo-Norman 10  Drain was then placed in the gallbladder fossa and pulled out through one of the 5 mm port sites    The right upper quadrant was copiously irrigated and irrigation was suctioned from the abdomen  The gallbladder was removed from the abdomen in an Endo-Catch bag via the umbilical port site  The gallbladder fossa was again inspected and there was no evidence of any bleeding or bile leakage  This time the CO2 was turned off  The ports removed under direct visualization  The umbilical port site was closed using 2-0 Vicryl stay sutures  All 4 skin incisions were closed using 4 O Monocryl followed by histocryl  The patient tolerated the procedure well and there was no apparent complications  He went to recovery room in satisfactory condition  I was present for the entire procedure    All needle instrument sponge counts were correct at the end of the procedure      Patient Disposition:  PACU     SIGNATURE: Army Terrance DO  DATE: February 3, 2019  TIME: 10:29 AM

## 2019-02-04 VITALS
BODY MASS INDEX: 27.47 KG/M2 | WEIGHT: 196.21 LBS | RESPIRATION RATE: 18 BRPM | DIASTOLIC BLOOD PRESSURE: 76 MMHG | TEMPERATURE: 98.5 F | HEIGHT: 71 IN | SYSTOLIC BLOOD PRESSURE: 142 MMHG | OXYGEN SATURATION: 93 % | HEART RATE: 72 BPM

## 2019-02-04 LAB
ANION GAP SERPL CALCULATED.3IONS-SCNC: 5 MMOL/L (ref 4–13)
BUN SERPL-MCNC: 26 MG/DL (ref 5–25)
CALCIUM SERPL-MCNC: 7.8 MG/DL (ref 8.3–10.1)
CHLORIDE SERPL-SCNC: 108 MMOL/L (ref 100–108)
CO2 SERPL-SCNC: 26 MMOL/L (ref 21–32)
CREAT SERPL-MCNC: 0.94 MG/DL (ref 0.6–1.3)
ERYTHROCYTE [DISTWIDTH] IN BLOOD BY AUTOMATED COUNT: 12.9 % (ref 11.6–15.1)
GFR SERPL CREATININE-BSD FRML MDRD: 88 ML/MIN/1.73SQ M
GLUCOSE SERPL-MCNC: 99 MG/DL (ref 65–140)
HCT VFR BLD AUTO: 34.3 % (ref 36.5–49.3)
HGB BLD-MCNC: 11.7 G/DL (ref 12–17)
MCH RBC QN AUTO: 31.3 PG (ref 26.8–34.3)
MCHC RBC AUTO-ENTMCNC: 34.1 G/DL (ref 31.4–37.4)
MCV RBC AUTO: 92 FL (ref 82–98)
PLATELET # BLD AUTO: 237 THOUSANDS/UL (ref 149–390)
PMV BLD AUTO: 10.3 FL (ref 8.9–12.7)
POTASSIUM SERPL-SCNC: 4.1 MMOL/L (ref 3.5–5.3)
RBC # BLD AUTO: 3.74 MILLION/UL (ref 3.88–5.62)
SODIUM SERPL-SCNC: 139 MMOL/L (ref 136–145)
WBC # BLD AUTO: 11.74 THOUSAND/UL (ref 4.31–10.16)

## 2019-02-04 PROCEDURE — G8989 SELF CARE D/C STATUS: HCPCS

## 2019-02-04 PROCEDURE — 80048 BASIC METABOLIC PNL TOTAL CA: CPT | Performed by: SURGERY

## 2019-02-04 PROCEDURE — 99024 POSTOP FOLLOW-UP VISIT: CPT | Performed by: SURGERY

## 2019-02-04 PROCEDURE — G8988 SELF CARE GOAL STATUS: HCPCS

## 2019-02-04 PROCEDURE — 97165 OT EVAL LOW COMPLEX 30 MIN: CPT

## 2019-02-04 PROCEDURE — 85027 COMPLETE CBC AUTOMATED: CPT | Performed by: SURGERY

## 2019-02-04 PROCEDURE — G8987 SELF CARE CURRENT STATUS: HCPCS

## 2019-02-04 RX ORDER — OXYCODONE HYDROCHLORIDE AND ACETAMINOPHEN 5; 325 MG/1; MG/1
1 TABLET ORAL EVERY 4 HOURS PRN
Qty: 20 TABLET | Refills: 0 | Status: SHIPPED | OUTPATIENT
Start: 2019-02-04 | End: 2019-02-14

## 2019-02-04 RX ORDER — OXYCODONE HYDROCHLORIDE AND ACETAMINOPHEN 5; 325 MG/1; MG/1
1 TABLET ORAL EVERY 4 HOURS PRN
Status: DISCONTINUED | OUTPATIENT
Start: 2019-02-04 | End: 2019-02-04 | Stop reason: HOSPADM

## 2019-02-04 RX ORDER — OXYCODONE HYDROCHLORIDE AND ACETAMINOPHEN 5; 325 MG/1; MG/1
1 TABLET ORAL EVERY 4 HOURS PRN
Qty: 20 TABLET | Refills: 0 | Status: SHIPPED | OUTPATIENT
Start: 2019-02-04 | End: 2022-04-27

## 2019-02-04 RX ADMIN — HEPARIN SODIUM 5000 UNITS: 5000 INJECTION INTRAVENOUS; SUBCUTANEOUS at 05:20

## 2019-02-04 RX ADMIN — CEFAZOLIN SODIUM 1000 MG: 10 INJECTION, POWDER, FOR SOLUTION INTRAVENOUS at 05:19

## 2019-02-04 RX ADMIN — AMLODIPINE BESYLATE 5 MG: 5 TABLET ORAL at 11:42

## 2019-02-04 RX ADMIN — ACETAMINOPHEN 650 MG: 325 TABLET, FILM COATED ORAL at 11:48

## 2019-02-04 RX ADMIN — METRONIDAZOLE 500 MG: 500 TABLET ORAL at 05:20

## 2019-02-04 RX ADMIN — OXYCODONE HYDROCHLORIDE 5 MG: 5 TABLET ORAL at 05:23

## 2019-02-04 RX ADMIN — DOCUSATE SODIUM 100 MG: 100 CAPSULE, LIQUID FILLED ORAL at 11:48

## 2019-02-04 NOTE — SOCIAL WORK
Cm reviewed patient during care coordination rounds  Patient anticipated for discharge today  Patient independent PTA  Patient to discharge home once stable  OT cleared patient for home with family support  Family to transport  Cm following

## 2019-02-04 NOTE — PROGRESS NOTES
Progress Note - General Surgery   Padmini Díaz 59 y o  male MRN: [id number]  Unit/Bed#: [facility] 823-01 Encounter: [id number]    Assessment:  59year old M with gangrenous acute cholecystitis s/p lap skye with IOC    Plan:  Regular diet  Pain control  Discharge home with AMARJIT drain  SQH    Subjective/Objective     Subjective: No acute events  Tolerating diet  Passing flatus no bowel movements yes  Walking well    Objective:    Blood pressure 130/74, pulse 76, temperature 97 8 °F (36 6 °C), temperature source Oral, resp  rate 20, height 5' 11" (1 803 m), weight 89 kg (196 lb 3 4 oz), SpO2 91 %  ,Body mass index is 27 37 kg/m²        Intake/Output Summary (Last 24 hours) at 02/04/19 1721  Last data filed at 02/04/19 0603   Gross per 24 hour   Intake          1469 16 ml   Output             1825 ml   Net          -355 84 ml       Invasive Devices     Peripheral Intravenous Line            Peripheral IV 02/01/19 Left Wrist 2 days    Peripheral IV 02/02/19 Left Hand 1 day    Peripheral IV 02/02/19 Right Antecubital 1 day          Drain            Closed/Suction Drain Right Abdomen Bulb 1 day                Physical Exam:   General: NAD, AAOx3  Eyes: PERRL  ENT: moist mucous membranes  Neck: supple  CV: RRR +S1/S2  Chest: breath sounds bilaterally  Abdomen: soft, mildly tender, non-distended, incisions c/d/i  AMARJIT serosanguinous  Extremities: atraumatic, no edema        Results from last 7 days  Lab Units 02/04/19  0455 02/03/19  0534 02/02/19  0534   WBC Thousand/uL 11 74* 16 93* 13 50*   HEMOGLOBIN g/dL 11 7* 13 0 15 5   HEMATOCRIT % 34 3* 37 8 44 6   PLATELETS Thousands/uL 237 246 274       Results from last 7 days  Lab Units 02/04/19  0455 02/03/19 02/02/19  0534   POTASSIUM mmol/L 4 1 4 1 3 9   CHLORIDE mmol/L 108 107 100   CO2 mmol/L 26 24 27   BUN mg/dL 26* 18 11   CREATININE mg/dL 0 94 1 02 1 07   CALCIUM mg/dL 7 8* 8 5 9 4

## 2019-02-04 NOTE — DISCHARGE INSTRUCTIONS
Call to follow up with Dr Joanie Cochran in 1 week, evaluate your AMARJIT drain  Please strip and record output of your drain daily and bring to your office visit  Do not drive while on narcotic pain medication    Laparoscopic Cholecystectomy   WHAT YOU NEED TO KNOW:   Laparoscopic cholecystectomy is surgery to remove your gallbladder  DISCHARGE INSTRUCTIONS:   Medicines: You may need any of the following:  · Prescription pain medicine  helps decrease pain  Do not wait until the pain is severe before you take this medicine  · NSAIDs  decrease swelling and pain  This medicine can be bought with or without a doctor's order  This medicine can cause stomach bleeding or kidney problems in certain people  If you take blood thinner medicine, always ask your healthcare provider if NSAIDs are safe for you  Read the medicine label and follow the directions on it before using this medicine  · Take your medicine as directed  Contact your healthcare provider if you think your medicine is not helping or if you have side effects  Tell him or her if you are allergic to any medicine  Keep a list of the medicines, vitamins, and herbs you take  Include the amounts, and when and why you take them  Bring the list or the pill bottles to follow-up visits  Carry your medicine list with you in case of an emergency  Follow up with your healthcare provider 2 weeks after surgery, or as directed:  Write down your questions so you remember to ask them during your visits  Wound care:  Care for your surgical wounds as directed  Keep the wounds clean and dry  You may take a shower the day after your surgery  What to eat after surgery:  Eat low-fat foods for 4 to 6 weeks while your body learns to digest fat without a gallbladder  Slowly increase the amount of fat that you eat  Drink plenty of liquids  Ask how much liquid to drink and which liquids are best for you  When to return to work and other activities:   You may return to work or other activities as soon as your pain is controlled and you feel comfortable  For many people, this is 5 to 7 days after surgery  Contact your healthcare provider if:   · You have a fever over 101°F (38°C) or chills  · You have pain or nausea that is not relieved by medicine  · You have redness and swelling around your incisions, or blood or pus is leaking from your incisions  · You are constipated or have diarrhea  · Your skin or eyes are yellow, or your bowel movements are pale  · You have questions or concerns about your surgery, condition, or care  Seek care immediately or call 911 if:   · You cannot stop vomiting  · Your bowel movements are black or bloody  · You have pain in your abdomen and it is swollen or hard  · Your arm or leg feels warm, tender, and painful  It may look swollen and red  · You feel lightheaded, short of breath, and have chest pain  · You cough up blood  © 2017 [facility] Information is for End User's use only and may not be sold, redistributed or otherwise used for commercial purposes  All illustrations and images included in CareNotes® are the copyrighted property of [employer]  or [employer]  The above information is an  only  It is not intended as medical advice for individual conditions or treatments  Talk to your doctor, nurse or pharmacist before following any medical regimen to see if it is safe and effective for you

## 2019-02-04 NOTE — OCCUPATIONAL THERAPY NOTE
OccupationalTherapy Evaluation     Patient Name: Laura Arriaga  ZFYJS'[id number] Date: 2/4/2019  Problem List  Patient Active Problem List   Diagnosis    Benign prostatic hyperplasia    Carotid artery stenosis    Elevated PSA    Hyperglycemia    Mixed hyperlipidemia    Hypertension    Dysphagia    Acute cholecystitis due to biliary calculus    Cholecystitis     Past Medical History  Past Medical History:   Diagnosis Date    Elevated PSA     Hyperlipidemia     Hypertension      Past Surgical History  History reviewed  No pertinent surgical history  02/04/19 0851   Note Type   Note type Eval only   Restrictions/Precautions   Weight Bearing Precautions Per Order No   Other Precautions Pain   Pain Assessment   Pain Assessment 0-10   Pain Score 2   Pain Type Acute pain;Surgical pain   Pain Location Abdomen   Pain Orientation Right   Patient's Stated Pain Goal No pain   Hospital Pain Intervention(s) Ambulation/increased activity; Distraction; Emotional support   Response to Interventions tolerated   Home Living   Type of [address] [place] [address] Two level; Able to live on main level with bedroom/bathroom; Performs ADLs on one level;Stairs to enter with rails  (Pt with 1st floor setup; 3 TREY)   Bathroom Shower/Tub Tub/shower unit   Bathroom Toilet Standard   Bathroom Equipment Commode   Bathroom Accessibility Accessible   Additional Comments Pt denies use of DME/AD PTA  Will borrow commode from family member   Prior Function   Level of [place] Independent with ADLs and functional mobility   Lives With Spouse;Daughter  (teenage daughter)   [place] Help From Family   ADL Assistance Independent   IADLs Independent   Falls in the last 6 months 0   Vocational Full time employment  ()   Comments Pt's wife does not work - home to assist as needed   Lifestyle   Autonomy Pt IND all ADLs, IADLs PTA  + driving, works FT   Pt ambulated IND w/o AD at baseline   Reciprocal Relationships Supportive family Service to Others Employed as    Intrinsic Gratification Enjoys watching sports, reading newspaper   Psychosocial   Psychosocial (WDL) WDL   Subjective   Subjective "I don't have any concerns"   ADL   Eating Assistance 7  Independent   Grooming Assistance 6  Modified Independent   Grooming Deficit Increased time to complete  (Standing at sink)   UB Bathing Assistance 6  Modified Independent   LB Bathing Assistance 6  Modified Independent   [facility] 6  Modified independent   [address] [facility] 6  Modified independent   LB Dressing Deficit Increased time to complete  (Donned/doffed socks)   Toileting Assistance  6  Modified independent   Toileting Deficit Increased time to complete  (Use of commode over toilet)   Bed Mobility   Supine to Sit 6  Modified independent   Additional items Increased time required   Sit to Supine 6  Modified independent   Additional items Increased time required   Transfers   Sit to Stand 7  Independent   Stand to Sit 7  Independent   Functional Mobility   Functional Mobility 6  Modified independent   Additional Comments Pt demo ability to ambulate household distances   Balance   Static Sitting Normal   Dynamic Sitting Good   Static Standing Good   Dynamic Standing Good   Activity Tolerance   Activity Tolerance Patient tolerated treatment well   Nurse Made Aware Spoke to RN - pt appropriate to be seen   RUE Assessment   RUE Assessment WNL   LUE Assessment   LUE Assessment WNL   Hand Function   Gross Motor Coordination Functional   Fine Motor Coordination Functional   Sensation   Light Touch No apparent deficits   Vision-Basic Assessment   Current Vision Wears glasses for distance only   Cognition   Overall Cognitive Status [facility]   Arousal/Participation Alert; Cooperative   Attention Within functional limits   Orientation Level Oriented X4   Memory Within functional limits   Following Commands Follows all commands and directions without difficulty   Assessment Prognosis Good   Assessment Pt is a 59 y o  male who was transferred from [address] [place] [address] to [facility] on 2/2/2019 with Cholecystitis  Pt s/p cholecystectomy laparoscopic and cholangiogram  Pt's problem list also includes PMH of HTN and hyperlipidemia  At baseline pt was completing all ADLs and IADLs IND, driving, and working full time  Pt ambulates IND w/o an AD at baseline  Pt lives with his wife and teenage daughter in a bilevel home with 3 TREY; pt has a 1st floor set up  Currently pt requires MOD IND for overall ADLS and for functional mobility/transfers  Pt performed toileting with commode over toilet; pt able to borrow commode from family member  Pt educated on ADL/IADL safety and use of commode  Pt's wife is home during the day and able to assist as needed  From OT standpoint, recommend HOME with family support upon D/C  No further acute OT services recommended at this time - pt in agreement with plan and reports no further questions/concerns     Goals   Patient Goals "to go home"   Recommendation   OT Discharge Recommendation Home with family support   OT - OK to Discharge Yes  (when medically appropriate)   Barthel Index   Feeding 10   Bathing 5   Grooming Score 5   Dressing Score 10   Bladder Score 10   Bowels Score 10   Toilet Use Score 10   Transfers (Bed/Chair) Score 15   Mobility (Level Surface) Score 0   Stairs Score 0   Barthel Index Score 75          [place], OT

## 2019-02-04 NOTE — DISCHARGE SUMMARY
Discharge Summary - Chidi Vidal 59 y o  male MRN: [id number]    Unit/Bed#: [facility] 823-01 Encounter: [id number]    Admission Date:   Admission Orders     Ordered        02/02/19 1230  Place in Observation  Once               Admitting Diagnosis: Abdominal pain [R10 9]    HPI: Abraham Shone a 59 y  o  male who presents with pmh HTN, HLD, no past surgeries, he presents with 3 days of RUQ abdominal pain  This has happened once in the past 2 weeks ago and resolved on its own  It is sharp and constant  No N/V, no diarrhea, he had a fever to 100 3 at the OSH where he was admitted for a day on cipro/flagyl  He has never noticed changes in his skin or stool color  He does not get the pain with fried or fatty food  CT scan shows a 19cm gallbladder with a stone in the neck  He has a mildly elevated T bili or 1 3 and WBC 13 5  Procedures Performed: No orders of the defined types were placed in this encounter  Summary of Hospital Course: Patient was transferred to [facility]  Upon evaluation the decision was made to precede to the OR for lap skye  A massively distended gallbladder was seen, 20cm, cholelithiasis, and patchy necrosis comitant with gangrenous cholecystitis, a AMARJIT drain was placed  Blood cultures were sent  He was admitted to the med surg floor post operatively on clears with prn pain control and continued on ancef/flagyl  POD 1 his diet was advanced to regular, once blood cx were negative for 24hr his abx were discontinued  POD 2 his pain is controlled, he is afebrile, tolerating PO, ambulating on his own and fit for discharge home  CM was consulted for VNA  He will follow up in the surgery office in 1 week and evaluate his AMARJIT drain  Significant Findings, Care, Treatment and Services Provided: 1 CTAP: multiple calculi noted towards the fundus  One stone is in the neck is 9 mm    The gallbladder measures approximately 19 cm   2/1 RUQ U/S: cholelithiasis with findings suspicious for acute cholecystitis  2/2 lap skye with ioc (obstructed cystic duct)     Complications: None    Discharge Diagnosis: Gangrenous cholecystitis     Resolved Problems  Date Reviewed: 2/1/2019    None          Condition at Discharge: good         Discharge instructions/Information to patient and family:   See after visit summary for information provided to patient and family  Provisions for Follow-Up Care:  See after visit summary for information related to follow-up care and any pertinent home health orders  PCP: Sera Pickett MD    Disposition: See After Visit Summary for discharge disposition information  Planned Readmission: No    Discharge Statement   I spent 30 minutes discharging the patient  This time was spent on the day of discharge  I had direct contact with the patient on the day of discharge  Additional documentation is required if more than 30 minutes were spent on discharge  Discharge Medications:  See after visit summary for reconciled discharge medications provided to patient and family

## 2019-02-04 NOTE — PROGRESS NOTES
Pt transported to [facility] with volunteer via wheelchair  Script given to patient, pt is aware dc instructions will be provided in dc lounge  Spoke to Kalpana Hill who is aware

## 2019-02-04 NOTE — UTILIZATION REVIEW
Network Utilization Review Department  Phone: [phone]; Fax [phone]  [email or web address]  ATTENTION: Please call with any questions or concerns to [phone]  and carefully listen to the prompts so that you are directed to the right person  Send all requests for admission clinical reviews, approved or denied determinations and any other requests to fax [phone]  All voicemails are confidential     Initial Clinical Review    Admission: Date/Time/Statement: 2/1/19 @ [address] This Encounter    Inpatient Admission     Standing Status:   Standing     Number of Occurrences:   1     Order Specific Question:   Admitting Physician     Answer:   Lord Chicas [[id number]]     Order Specific Question:   Level of Care     Answer:   Med Surg [16]     Order Specific Question:   Estimated length of stay     Answer:   Inpatient Only Surgery     ED: Date/Time/Mode of Arrival:   ED Arrival Information     Expected Arrival Acuity Means of Arrival Escorted By Service Admission Type    - 2/1/2019 19:42 Urgent Walk-In Self Surgery-General Urgent    Arrival Complaint    chest pain        Chief Complaint   Patient presents with    Chest Pain     chest pain since last night following dinner  states "feels like reflux" chest pain from epigastric area to top of central chest   denies other radiating factors, or SOB  states he became diaphoretic at home earlier tonight     Nausea     nausea only no emesis      History of Illness: 58 y/o male presents to ED from home with acute onset epigastric pain starting after dinner yesterday  Patient describes the pain as an ache in the center of his epigastric area and radiates up toward his chest   Patient states he has not eaten since yesterday evening because he has no appetite  On exam, pt has epigastric tenderness    In ED, CT scan shows gallbladder stones and noted significant gallbladder distention likely consistent with acute cholecystitis  Leukocytosis of 12  Bilirubin slightly elevated 1 2  ED Triage Vitals   Temperature Pulse Respirations Blood Pressure SpO2   02/01/19 1952 02/01/19 1952 02/01/19 1952 02/01/19 1952 02/01/19 1952   97 9 °F (36 6 °C) 83 18 (!) 180/91 98 %      Temp Source Heart Rate Source Patient Position - Orthostatic VS BP Location FiO2 (%)   02/01/19 1952 02/01/19 2015 02/01/19 1952 02/01/19 1952 --   Temporal Monitor Lying Right arm       Pain Score       02/01/19 1952       7        Wt Readings from Last 1 Encounters:   02/04/19 89 kg (196 lb 3 4 oz)     Vital Signs (abnormal):   02/02/19 0734 100 3 °F (37 9 °C) 78 18 147/84 93 % None (Room air)   02/01/19 2352 99 1 °F (37 3 °C) 71 18 168/87 95 % None (Room air)   02/01/19 2200 -- 75 21 156/90 99 % None (Room air)   02/01/19 2102 -- 77 16 140/55 99 % None (Room air)   02/01/19 2015 -- 78 22 155/93 99 % None (Room air)       Pertinent Labs/Diagnostic Test Results:   Alk Phos 49, 46  Total bili 1 20, 1 30  Lipase 28  Trop <0 03  WBC 12 80, 13 50    EKG:  NSR    CT Abd/pelvis:  Markedly distended gallbladder with multiple calculi noted towards the fundus   One stone is in the neck as seen on image 5/110 measuring 9 mm   The gallbladder measures approximately 19 cm in diameter (normal less than 10 cm)  Borderline splenomegaly  Indeterminate left adrenal gland nodule measuring 2 0 cm       U/S RUQ:  The gallbladder is markedly distended   There is cholelithiasis with findings suspicious for acute cholecystitis   Surgical consultation is recommended   If further imaging is necessary, nuclear medicine   hepatobiliary imaging could be performed      ED Treatment:   Medication Administration from 02/01/2019 1941 to 02/01/2019 2312       Date/Time Order Dose Route Action     02/01/2019 2058 pantoprazole (PROTONIX) injection 40 mg 40 mg Intravenous Given     02/01/2019 2057 morphine injection 2 mg 2 mg Intravenous Given     02/01/2019 2157 ketorolac (TORADOL) injection 30 mg 30 mg Intravenous Given     02/01/2019 2157 ondansetron (ZOFRAN) injection 4 mg 4 mg Intravenous Given     02/01/2019 2300 lactated ringers infusion 125 mL/hr Intravenous New Bag     02/01/2019 2300 ciprofloxacin (CIPRO) IVPB (premix) 400 mg 400 mg Intravenous New Bag        Past Medical/Surgical History: Active Ambulatory Problems     Diagnosis Date Noted    Benign prostatic hyperplasia 08/12/2013    Carotid artery stenosis 10/10/2012    Elevated PSA 06/08/2016    Hyperglycemia 09/24/2014    Mixed hyperlipidemia 10/10/2012    Hypertension 10/10/2012    Dysphagia 01/23/2019    Cholecystitis 02/02/2019     Past Medical History:   Diagnosis Date    Elevated PSA     Hyperlipidemia     Hypertension      Admitting Diagnosis: Cholecystitis [K81 9]  Chest pain [R07 9]  Age/Sex: 59 y o  male    Assessment/Plan: 58 y/o male to ED from home  Admitted as inpatient due to acute cholecystitis, hyperbilirubinemia  Pt continues to have abdominal pain  Continue IVF  IV antibiotics  Keep NPO  Pain control  Repeat labs  Follow-up right upper quadrant ultrasound to assess for CBD measurement for the potential of possible choledocholithiasis  After imaging review, it is determined that pt will need surgery which would consist of laparoscopic cholecystectomy, possible open  Given the imaging results of extremely large and distended gallbladder, it is expected that this will be a very complex laparoscopic surgery with high likelihood of converting to open  Therefore, pt will be transferred to [facility] for surgical intervention      Admission Orders:  NPO  CT abd/pelvis  US RUQ  Labs  Tele  Continuous pulse ox  EKG    Scheduled Meds:   Facility-Administered Medications Ordered in Other Encounters:  acetaminophen 650 mg Oral Q6H PRN x1   amLODIPine 5 mg Oral Daily   diphenhydrAMINE 25 mg Intravenous Q6H PRN   docusate sodium 100 mg Oral BID PRN   heparin (porcine) 5,000 Units Subcutaneous Q8H [facility]   HYDROmorphone 1 mg Intravenous Q3H PRN x4   ondansetron 4 mg Intravenous Q4H PRN x2   oxyCODONE 10 mg Oral Q4H PRN   oxyCODONE 5 mg Oral Q4H PRN   pneumococcal 13-valent conjugate vaccine 0 5 mL Intramuscular Prior to discharge   morphine injection 2 mg  Dose: 2 mg  Freq: Every 4 hours PRN Route: IV x1  ciprofloxacin (CIPRO) IVPB (premix) 400 mg  Dose: 400 mg  Freq: Every 12 hours Route: IV x2  metroNIDAZOLE (FLAGYL) IVPB (premix) 500 mg  Dose: 500 mg  Freq: Every 8 hours Route: IV x2

## 2019-02-04 NOTE — UTILIZATION REVIEW
Continued Stay Review    Date/POD#: 2/4/19  POD #1 Lap skye with IOC     Vital Signs: /76 (BP Location: Left arm)   Pulse 72   Temp 98 5 °F (36 9 °C) (Oral)   Resp 18   Ht 5' 11" (1 803 m)   Wt 89 kg (196 lb 3 4 oz)   SpO2 93%   BMI 27 37 kg/m²      Assessment/Plan:   2/4 Surgery Progress Note   59year old M with gangrenous acute cholecystitis s/p lap skye with IOC     Plan:  Regular diet  Pain control  Discharge home  Discontinue AMARJIT prior to discharge  SQH    Medications:   Scheduled Meds:   Current Facility-Administered Medications:  acetaminophen 650 mg Oral Q6H PRN   amLODIPine 5 mg Oral Daily   diphenhydrAMINE 25 mg Intravenous Q6H PRN   docusate sodium 100 mg Oral BID PRN   heparin (porcine) 5,000 Units Subcutaneous Q8H [address]   HYDROmorphone 0 5 mg Intravenous Q3H PRN   ondansetron 4 mg Intravenous Q4H PRN   oxyCODONE 10 mg Oral Q4H PRN   oxyCODONE 5 mg Oral Q4H PRN   pneumococcal 13-valent conjugate vaccine 0 5 mL Intramuscular Prior to discharge     PRN Meds:   acetaminophen    diphenhydrAMINE    docusate sodium    HYDROmorphone    ondansetron    oxyCODONE 5 mg x 3 in last 24 hrs     oxyCODONE 10 mg     pneumococcal 13-valent conjugate vaccine     Pertinent Labs/Diagnostic Results:     BUN 26  Cameron 7 8  WBC 16 93, 11 74  H/H 11 7/34 3      Age/Sex: 59 y o  male     Discharge Plan: home     Network Utilization Review Department  Phone: [phone]; Fax [phone]  [email or web address]  ATTENTION: Please call with any questions or concerns to [phone]  and carefully listen to the prompts so that you are directed to the right person  Send all requests for admission clinical reviews, approved or denied determinations and any other requests to fax [phone]   All voicemails are confidential

## 2019-02-07 LAB
BACTERIA BLD CULT: NORMAL
BACTERIA BLD CULT: NORMAL

## 2019-02-08 ENCOUNTER — TELEPHONE (OUTPATIENT)
Dept: SURGERY | Facility: HOSPITAL | Age: 59
End: 2019-02-08

## 2019-02-08 NOTE — TELEPHONE ENCOUNTER
Meek Medina from [address] [place] [address] will fax release to send in office notes

## 2019-02-11 ENCOUNTER — TRANSITIONAL CARE MANAGEMENT (OUTPATIENT)
Dept: INTERNAL MEDICINE CLINIC | Facility: CLINIC | Age: 59
End: 2019-02-11

## 2019-02-11 ENCOUNTER — OFFICE VISIT (OUTPATIENT)
Dept: SURGERY | Facility: HOSPITAL | Age: 59
End: 2019-02-11

## 2019-02-11 ENCOUNTER — OFFICE VISIT (OUTPATIENT)
Dept: INTERNAL MEDICINE CLINIC | Facility: CLINIC | Age: 59
End: 2019-02-11
Payer: COMMERCIAL

## 2019-02-11 VITALS
HEIGHT: 71 IN | WEIGHT: 187 LBS | BODY MASS INDEX: 26.18 KG/M2 | SYSTOLIC BLOOD PRESSURE: 118 MMHG | TEMPERATURE: 98.1 F | DIASTOLIC BLOOD PRESSURE: 70 MMHG | HEART RATE: 78 BPM

## 2019-02-11 VITALS
BODY MASS INDEX: 26.4 KG/M2 | HEIGHT: 71 IN | DIASTOLIC BLOOD PRESSURE: 78 MMHG | HEART RATE: 72 BPM | SYSTOLIC BLOOD PRESSURE: 120 MMHG | WEIGHT: 188.6 LBS

## 2019-02-11 DIAGNOSIS — R97.20 ELEVATED PSA: ICD-10-CM

## 2019-02-11 DIAGNOSIS — I10 ESSENTIAL HYPERTENSION: Chronic | ICD-10-CM

## 2019-02-11 DIAGNOSIS — K80.00 ACUTE CHOLECYSTITIS DUE TO BILIARY CALCULUS: Primary | ICD-10-CM

## 2019-02-11 DIAGNOSIS — K80.00 ACUTE CHOLECYSTITIS DUE TO BILIARY CALCULUS: Primary | Chronic | ICD-10-CM

## 2019-02-11 DIAGNOSIS — E78.2 MIXED HYPERLIPIDEMIA: ICD-10-CM

## 2019-02-11 PROCEDURE — 99024 POSTOP FOLLOW-UP VISIT: CPT | Performed by: SURGERY

## 2019-02-11 PROCEDURE — 99496 TRANSJ CARE MGMT HIGH F2F 7D: CPT | Performed by: INTERNAL MEDICINE

## 2019-02-11 PROCEDURE — 1111F DSCHRG MED/CURRENT MED MERGE: CPT | Performed by: SURGERY

## 2019-02-11 NOTE — ASSESSMENT & PLAN NOTE
Status post laparoscopic cholecystectomy with drain placement doing well  Drain contained serosanguineous drainage and thus was removed in the office  Pathology reviewed discussed the patient  Otherwise doing well  Follow-up angelita Lopez

## 2019-02-11 NOTE — PROGRESS NOTES
Assessment/Plan:   1  Status post cholecystectomy for calculous cholecystitis-as noted intraoperative cholangiogram was nondiagnostic  He still has a drain in place and is meeting with surgeon today  He appears to be recovering  2  Left incidental adrenal lesion-likely incidental adrenaloma-will review more detail next visit but he has nothing clinically to suggest issues of present  5  3  Elevated PSA-previously running around 1-2 then abruptly climbed to 3 5-repeated since then normal on several occasions  Now climbed to 6 5  Met with Urology and is scheduled for repeat PSA this week-if still elevated he will then have biopsy  4  Solid food dysphagia-rule out Schatzki's ring-we had referred him to GI for endoscopy but he never heard from GI-at this point just monitoring-she will call for recurrent symptoms  5  Subclinical carotid stenosis-carotid Doppler unchanged over 6 year span last done in the year 2018  6  Hypertension-adequate control on amlodipine at current dose of ACE-inhibitor-with his adrenal adenoma could consider renin and aldosterone levels next visit  7  Strong family history CAD-most recent stress test normal  8  Hyperlipidemia-had elevated liver enzymes with atorvastatin  Pravastatin affective  Much improved on generic Crestor      MEDICAL REGIMEN:      Crestor 5 milligrams daily, fish oil 2 grams daily, baby aspirin daily, enalapril 10 milligrams b i d , amlodipine 5 milligrams daily    Addendum- was seen by Urology -they commented that PSA in January was 6 5 and February was 1 2 and in August is now 3 0-they recommended appointment in 6 months with PSA and if stable they felt then that he could return to yearly exam  No problem-specific Assessment & Plan notes found for this encounter         Diagnoses and all orders for this visit:    Acute cholecystitis due to biliary calculus    Essential hypertension    Mixed hyperlipidemia    Elevated PSA         Subjective:     Patient ID: Stalin Briggsrset Jose Luis Hines is a 59 y o  male  He is seen today after recent hospitalization for gallbladder disease  He developed severe right upper quadrant pain  Presented to the ER  He did not have any nausea vomiting diarrhea  His temperature was a 100 3 degrees  Use initiate admitted and placed on IV Cipro and Flagyl  CT scan shows a 19 centimeter gallbladder with a stone in the neck  White count was 13 5 with a bilirubin 1 3  He had a laparoscopic cholecystectomy  Massively distended gallbladder with seen  He had gangrenous cholecystitis  He required placement of a AMARJIT drain  His diet was advanced  Intraoperative cholangiogram was nondiagnostic  He has recovered  He is anxious to have is 80 drain removed  Fall x-ray studies were reviewed  All labs reviewed  Prior to discharge followed cultures were negative, creatinine 1 07 with normal liver enzymes other than a bilirubin of 1 3  Troponin was negative  Ultrasound right upper quadrant showed markedly distended gallbladder  CT scan of abdomen pelvis showed distended gallbladder with stone in the neck  There was an indeterminate left adrenal gland nodule measuring 2 point 0 centimeters  He has a history of hypertension  He is back on baseline regimen  BP adequately controlled on current regimen  He has an elevated PSA  He had been sent to Urology is scheduled for repeat PSA this week  He has not had any post cholecystectomy diarrhea  He appears to be recovering from the surgery without difficulty  Review of Systems   Constitutional: Negative  Respiratory: Negative  Cardiovascular: Negative  Gastrointestinal: Positive for abdominal pain  Endocrine: Negative  Genitourinary: Negative  Musculoskeletal: Negative  Neurological: Negative  Hematological: Negative  Psychiatric/Behavioral: Negative  Objective:     Physical Exam   Constitutional: He is oriented to person, place, and time   He appears well-developed and well-nourished  No distress  HENT:   Head: Normocephalic and atraumatic  Right Ear: External ear normal    Left Ear: External ear normal    Nose: Nose normal    Mouth/Throat: Oropharynx is clear and moist  No oropharyngeal exudate  Eyes: Pupils are equal, round, and reactive to light  Conjunctivae and EOM are normal  Right eye exhibits no discharge  Left eye exhibits no discharge  No scleral icterus  Neck: No JVD present  No tracheal deviation present  No thyromegaly present  Cardiovascular: Normal rate, regular rhythm, normal heart sounds and intact distal pulses  Exam reveals no gallop and no friction rub  No murmur heard  Pulmonary/Chest: Effort normal and breath sounds normal  No stridor  No respiratory distress  He has no wheezes  He exhibits no tenderness  Abdominal: Soft  Bowel sounds are normal  He exhibits no distension and no mass  There is no tenderness  There is no rebound and no guarding  Evidence of recent surgery  Drain in place   Genitourinary: Rectal exam shows guaiac negative stool  Musculoskeletal: Normal range of motion  He exhibits no edema, tenderness or deformity  Lymphadenopathy:     He has no cervical adenopathy  Neurological: He is alert and oriented to person, place, and time  He has normal reflexes  He displays normal reflexes  No cranial nerve deficit  He exhibits normal muscle tone  Coordination normal    Skin: Skin is warm and dry  No rash noted  He is not diaphoretic  No erythema  No pallor  Psychiatric: He has a normal mood and affect  His behavior is normal  Judgment and thought content normal          Vitals:    02/11/19 1025   Weight: 85 5 kg (188 lb 9 6 oz)   Height: 5' 11" (1 803 m)       Transitional Care Management Review:  Radha Abraham is a 59 y o  male here for TCM follow up       During the TCM phone call patient stated:    TCM Call (since 1/11/2019)     Date and time call was made  2/6/2019 10:24 AM    Hospital care reviewed  Records reviewed Patient was hospitialized at  [facility]    Date of Admission  02/02/19    Date of discharge  02/04/19    Diagnosis  CHOLECYCTITIS    Disposition  Home      TCM Call (since 1/11/2019)     Scheduled for follow up? Yes    I have advised the patient to call PCP with any new or worsening symptoms  CURTIS QUINONES    Living Arrangements  Family members    Counseling  Family;  Patient          Nilesh Villanueva MD

## 2019-02-11 NOTE — PROGRESS NOTES
Assessment/Plan:    Acute cholecystitis due to biliary calculus  Status post laparoscopic cholecystectomy with drain placement doing well  Drain contained serosanguineous drainage and thus was removed in the office  Pathology reviewed discussed the patient  Otherwise doing well  Follow-up angelita Jaramillo Diagnoses and all orders for this visit:    Acute cholecystitis due to biliary calculus          Subjective:      Patient ID: Radha Abraham is a 59 y o  male  HPI    The following portions of the patient's history were reviewed and updated as appropriate:   He  has a past medical history of Elevated PSA, Hyperlipidemia, and Hypertension  He   Patient Active Problem List    Diagnosis Date Noted    Cholecystitis 02/02/2019    Dysphagia 01/23/2019    Elevated PSA 06/08/2016    Hyperglycemia 09/24/2014    Benign prostatic hyperplasia 08/12/2013    Carotid artery stenosis 10/10/2012    Mixed hyperlipidemia 10/10/2012    Hypertension 10/10/2012     He  has a past surgical history that includes CHOLECYSTECTOMY LAPAROSCOPIC (N/A, 2/2/2019) and CHOLANGIOGRAM (N/A, 2/2/2019)  His family history includes Coronary artery disease in his family; Heart disease in his father; Hyperlipidemia in his family; Hypertension in his father and mother; Osteoarthritis in his family; Peripheral vascular disease in his family  He  reports that he has never smoked  He has never used smokeless tobacco  He reports that he drinks alcohol  He reports that he does not use drugs    Current Outpatient Medications   Medication Sig Dispense Refill    acetaminophen-codeine (TYLENOL with CODEINE #3) 300-30 MG per tablet Take by mouth      amLODIPine (NORVASC) 5 mg tablet Take 1 tablet (5 mg total) by mouth daily 90 tablet 3    aspirin 81 MG tablet Take by mouth once       enalapril (VASOTEC) 10 mg tablet Take 1 tablet (10 mg total) by mouth 2 (two) times a day 180 tablet 3    Omega-3 Fatty Acids (FISH OIL) 1,000 mg Take 1 capsule by mouth 2 (two) times a day        rosuvastatin (CRESTOR) 5 mg tablet Take 1 tablet (5 mg total) by mouth daily 90 tablet 3    oxyCODONE-acetaminophen (PERCOCET) 5-325 mg per tablet Take 1 tablet by mouth every 4 (four) hours as needed for moderate pain for up to 10 days Max Daily Amount: 6 tablets (Patient not taking: Reported on 2/11/2019) 20 tablet 0    oxyCODONE-acetaminophen (PERCOCET) 5-325 mg per tablet Take 1 tablet by mouth every 4 (four) hours as needed for moderate pain for up to 10 doses Max Daily Amount: 6 tablets (Patient not taking: Reported on 2/11/2019) 20 tablet 0     No current facility-administered medications for this visit  He has No Known Allergies       Review of Systems   Constitutional: Negative  Gastrointestinal: Positive for abdominal pain (At the drain site  )  Skin: Negative for color change, pallor, rash and wound  Objective:      /70   Pulse 78   Temp 98 1 °F (36 7 °C)   Ht 5' 11" (1 803 m)   Wt 84 8 kg (187 lb)   BMI 26 08 kg/m²          Physical Exam   Constitutional: He appears well-developed and well-nourished  No distress  Abdominal: Soft  He exhibits no distension and no mass  There is no tenderness  There is no rebound and no guarding  No hernia  Incisions clean, dry, intact  Her right lower quadrant there is a drain in place with no serosanguineous drainage in the AMARJIT bulb  Skin: Skin is warm and dry  He is not diaphoretic  Vitals reviewed  Procedure:    Right lower quadrant drain removed without complication

## 2019-02-14 ENCOUNTER — APPOINTMENT (OUTPATIENT)
Dept: LAB | Facility: HOSPITAL | Age: 59
End: 2019-02-14
Attending: UROLOGY
Payer: COMMERCIAL

## 2019-02-14 DIAGNOSIS — R97.20 ELEVATED PSA: ICD-10-CM

## 2019-02-14 LAB — PSA SERPL-MCNC: 1.2 NG/ML (ref 0–4)

## 2019-02-14 PROCEDURE — 84153 ASSAY OF PSA TOTAL: CPT

## 2019-02-14 PROCEDURE — 36415 COLL VENOUS BLD VENIPUNCTURE: CPT

## 2019-02-15 ENCOUNTER — TELEPHONE (OUTPATIENT)
Dept: UROLOGY | Facility: CLINIC | Age: 59
End: 2019-02-15

## 2019-02-15 NOTE — TELEPHONE ENCOUNTER
----- Message from Vasu Barksdale MD sent at 2/15/2019  7:14 AM EST -----  Can we let patient know PSA normalized?  Can f/u in 6mos with AP with repeat PSA

## 2019-07-23 ENCOUNTER — TELEPHONE (OUTPATIENT)
Dept: INTERNAL MEDICINE CLINIC | Facility: CLINIC | Age: 59
End: 2019-07-23

## 2019-07-23 NOTE — TELEPHONE ENCOUNTER
Rescheduled over the next 2 months at the end of the day on Wednesday but do not make appt any later than 4:00 p m

## 2019-07-23 NOTE — TELEPHONE ENCOUNTER
Made appointment for 02*94 Wednesday at [facility]  Left message for patient to call back and confirm

## 2019-07-23 NOTE — TELEPHONE ENCOUNTER
Patient can not make appointment tomorrow   He would like to reschedule , and is requesting a later appointment due to work      Please advise  [phone]

## 2019-08-19 ENCOUNTER — APPOINTMENT (OUTPATIENT)
Dept: LAB | Facility: HOSPITAL | Age: 59
End: 2019-08-19
Attending: UROLOGY
Payer: COMMERCIAL

## 2019-08-19 DIAGNOSIS — R97.20 ELEVATED PSA: ICD-10-CM

## 2019-08-19 LAB — PSA SERPL-MCNC: 3 NG/ML (ref 0–4)

## 2019-08-19 PROCEDURE — G0103 PSA SCREENING: HCPCS

## 2019-08-19 PROCEDURE — 36415 COLL VENOUS BLD VENIPUNCTURE: CPT

## 2019-08-22 ENCOUNTER — OFFICE VISIT (OUTPATIENT)
Dept: UROLOGY | Facility: CLINIC | Age: 59
End: 2019-08-22
Payer: COMMERCIAL

## 2019-08-22 VITALS
WEIGHT: 198 LBS | HEART RATE: 66 BPM | BODY MASS INDEX: 27.62 KG/M2 | SYSTOLIC BLOOD PRESSURE: 122 MMHG | DIASTOLIC BLOOD PRESSURE: 68 MMHG

## 2019-08-22 DIAGNOSIS — R97.20 ELEVATED PSA: Primary | ICD-10-CM

## 2019-08-22 PROCEDURE — 99213 OFFICE O/P EST LOW 20 MIN: CPT | Performed by: PHYSICIAN ASSISTANT

## 2019-08-22 NOTE — PROGRESS NOTES
8/22/2019      Chief Complaint   Patient presents with    Elevated PSA         Assessment and Plan    62 y o  male managed by Dr Nazanin Mccloud    1  Prostate cancer screening  - prior PSA 1 1-3 5 since 2016 with normal DREs  - isolated elevated PSA 6 5 prompted visit to us January 2019  - short term followup PSA back down to 1 2 February 2019  - current PSA 3 0 August 2019  - PSA 6 months with KARLIE then if stable baseline, resume annual visits      Component      Latest Ref Rng & Units 6/4/2016 6/24/2016 12/9/2016 1/17/2018   PSA, Total      0 0 - 4 0 ng/mL 3 5 1 1 1 2 1 1     Component      Latest Ref Rng & Units 1/17/2019 2/14/2019 8/19/2019   PSA, Total      0 0 - 4 0 ng/mL 6 5 (H) 1 2 3 0           History of Present Illness  Tasia Mistry is a 62 y o  male here for evaluation of history of elevated PSA  His PSAs had been low and stable around 1 1-1 2, with one value of 3 5 back in 2016  He then had a a significantly elevated value that was out of character for him at 6 5 in the beginning of this year 2019  Short-term follow-up several weeks later showed resolution of the PSA back down to 1 2  He presents today a six month follow-up PSA now up at 3 0 again  He has no family history of prostate cancer  I have offered patient continued short-term surveillance with an additional PSA in six months  If this remains stable, we will resume annual visits  He has no lower urinary tract symptoms to report  Review of Systems   Constitutional: Negative  Respiratory: Negative  Cardiovascular: Negative  Genitourinary: Negative for decreased urine volume, difficulty urinating, dysuria, flank pain, frequency, hematuria and urgency  Musculoskeletal: Negative  AUA SYMPTOM SCORE      Most Recent Value   AUA SYMPTOM SCORE   How often have you had a sensation of not emptying your bladder completely after you finished urinating?   0   How often have you had to urinate again less than two hours after you finished urinating? 0   How often have you found you stopped and started again several times when you urinate?  0   How often have you found it difficult to postpone urination? 0   How often have you had a weak urinary stream?  0   How often have you had to push or strain to begin urination? 0   How many times did you most typically get up to urinate from the time you went to bed at night until the time you got up in the morning?   1   Quality of Life: If you were to spend the rest of your life with your urinary condition just the way it is now, how would you feel about that?  2   AUA SYMPTOM SCORE  1          Past Medical History  Past Medical History:   Diagnosis Date    Elevated PSA     Hyperlipidemia     Hypertension      Past Social History  Past Surgical History:   Procedure Laterality Date    CHOLANGIOGRAM N/A 2/2/2019    Procedure: CHOLANGIOGRAM;  Surgeon: Maria Del Carmen Downs DO;  Location: [facility];  Service: General    CHOLECYSTECTOMY LAPAROSCOPIC N/A 2/2/2019    Procedure: CHOLECYSTECTOMY LAPAROSCOPIC with IOC;  Surgeon: Maria Del Carmen Downs DO;  Location: [facility];  Service: General     Social History     Tobacco Use   Smoking Status Never Smoker   Smokeless Tobacco Never Used       Past Family History  Family History   Problem Relation Age of Onset    Hypertension Mother     Heart disease Father         CARDIAC DISORDER    Hypertension Father     Coronary artery disease Family     Hyperlipidemia Family     Osteoarthritis Family     Peripheral vascular disease Family      Past Social history  Social History     Socioeconomic History    Marital status: /Civil Union     Spouse name: Not on file    Number of children: Not on file    Years of education: Not on file    Highest education level: Not on file   Occupational History    Not on file   Social Needs    Financial resource strain: Not on file    Food insecurity:     Worry: Not on file     Inability: Not on file   L3 needs: Medical: Not on file     Non-medical: Not on file   Tobacco Use    Smoking status: Never Smoker    Smokeless tobacco: Never Used   Substance and Sexual Activity    Alcohol use: Yes     Comment: SOCIAL     Drug use: No    Sexual activity: Not on file   Lifestyle    Physical activity:     Days per week: Not on file     Minutes per session: Not on file    Stress: Not on file   Relationships    Social connections:     Talks on phone: Not on file     Gets together: Not on file     Attends Bahai service: Not on file     Active member of club or organization: Not on file     Attends meetings of clubs or organizations: Not on file     Relationship status: Not on file    Intimate partner violence:     Fear of current or ex partner: Not on file     Emotionally abused: Not on file     Physically abused: Not on file     Forced sexual activity: Not on file   Other Topics Concern    Not on file   Social History Narrative    Not on file     Current Medications  Current Outpatient Medications   Medication Sig Dispense Refill    acetaminophen-codeine (TYLENOL with CODEINE #3) 300-30 MG per tablet Take by mouth      amLODIPine (NORVASC) 5 mg tablet Take 1 tablet (5 mg total) by mouth daily 90 tablet 3    aspirin 81 MG tablet Take by mouth once       enalapril (VASOTEC) 10 mg tablet Take 1 tablet (10 mg total) by mouth 2 (two) times a day 180 tablet 3    Omega-3 Fatty Acids (FISH OIL) 1,000 mg Take 1 capsule by mouth 2 (two) times a day        rosuvastatin (CRESTOR) 5 mg tablet Take 1 tablet (5 mg total) by mouth daily 90 tablet 3    oxyCODONE-acetaminophen (PERCOCET) 5-325 mg per tablet Take 1 tablet by mouth every 4 (four) hours as needed for moderate pain for up to 10 doses Max Daily Amount: 6 tablets (Patient not taking: Reported on 2/11/2019) 20 tablet 0     No current facility-administered medications for this visit        Allergies  No Known Allergies      The following portions of the patient's history were reviewed and updated as appropriate: allergies, current medications, past medical history, past social history, past surgical history and problem list       Vitals  Vitals:    08/22/19 1500   BP: 122/68   Pulse: 66   Weight: 89 8 kg (198 lb)       Physical Exam   Constitutional: He is oriented to person, place, and time  He appears well-developed and well-nourished  No distress  HENT:   Head: Normocephalic and atraumatic  Pulmonary/Chest: Effort normal    Musculoskeletal: He exhibits no edema  Neurological: He is alert and oriented to person, place, and time  Gait normal    Skin: Skin is warm and dry  He is not diaphoretic  Psychiatric: He has a normal mood and affect  His speech is normal and behavior is normal    Nursing note and vitals reviewed          Results  No results found for this or any previous visit (from the past 1 hour(s)) ]  Lab Results   Component Value Date    PSA 3 0 08/19/2019    PSA 1 2 02/14/2019    PSA 6 5 (H) 01/17/2019    PSA 1 1 01/17/2018     Lab Results   Component Value Date    GLUCOSE 95 12/02/2015    CALCIUM 7 8 (L) 02/04/2019     12/02/2015    K 4 1 02/04/2019    CO2 26 02/04/2019     02/04/2019    BUN 26 (H) 02/04/2019    CREATININE 0 94 02/04/2019     Lab Results   Component Value Date    WBC 11 74 (H) 02/04/2019    HGB 11 7 (L) 02/04/2019    HCT 34 3 (L) 02/04/2019    MCV 92 02/04/2019     02/04/2019         Orders  Orders Placed This Encounter   Procedures    PSA Total, Diagnostic     Standing Status:   Future     Standing Expiration Date:   8/22/2020

## 2019-09-17 ENCOUNTER — APPOINTMENT (OUTPATIENT)
Dept: LAB | Facility: HOSPITAL | Age: 59
End: 2019-09-17
Payer: COMMERCIAL

## 2019-09-17 DIAGNOSIS — I10 HYPERTENSION, UNSPECIFIED TYPE: ICD-10-CM

## 2019-09-17 DIAGNOSIS — E78.5 HYPERLIPIDEMIA, UNSPECIFIED HYPERLIPIDEMIA TYPE: ICD-10-CM

## 2019-09-17 LAB
ALBUMIN SERPL BCP-MCNC: 3.9 G/DL (ref 3.5–5)
ALP SERPL-CCNC: 44 U/L (ref 46–116)
ALT SERPL W P-5'-P-CCNC: 53 U/L (ref 12–78)
ANION GAP SERPL CALCULATED.3IONS-SCNC: 7 MMOL/L (ref 4–13)
AST SERPL W P-5'-P-CCNC: 30 U/L (ref 5–45)
BILIRUB SERPL-MCNC: 0.8 MG/DL (ref 0.2–1)
BUN SERPL-MCNC: 13 MG/DL (ref 5–25)
CALCIUM SERPL-MCNC: 8.8 MG/DL (ref 8.3–10.1)
CHLORIDE SERPL-SCNC: 104 MMOL/L (ref 100–108)
CHOLEST SERPL-MCNC: 169 MG/DL (ref 50–200)
CO2 SERPL-SCNC: 28 MMOL/L (ref 21–32)
CREAT SERPL-MCNC: 1.01 MG/DL (ref 0.6–1.3)
GFR SERPL CREATININE-BSD FRML MDRD: 80 ML/MIN/1.73SQ M
GLUCOSE P FAST SERPL-MCNC: 98 MG/DL (ref 65–99)
HDLC SERPL-MCNC: 48 MG/DL (ref 40–60)
LDLC SERPL DIRECT ASSAY-MCNC: 97 MG/DL (ref 0–100)
POTASSIUM SERPL-SCNC: 4.1 MMOL/L (ref 3.5–5.3)
PROT SERPL-MCNC: 6.9 G/DL (ref 6.4–8.2)
SODIUM SERPL-SCNC: 139 MMOL/L (ref 136–145)
TRIGL SERPL-MCNC: 193 MG/DL

## 2019-09-17 PROCEDURE — 36415 COLL VENOUS BLD VENIPUNCTURE: CPT

## 2019-09-17 PROCEDURE — 80061 LIPID PANEL: CPT

## 2019-09-17 PROCEDURE — 83721 ASSAY OF BLOOD LIPOPROTEIN: CPT

## 2019-09-17 PROCEDURE — 80053 COMPREHEN METABOLIC PANEL: CPT

## 2019-09-21 ENCOUNTER — OFFICE VISIT (OUTPATIENT)
Dept: INTERNAL MEDICINE CLINIC | Facility: CLINIC | Age: 59
End: 2019-09-21
Payer: COMMERCIAL

## 2019-09-21 DIAGNOSIS — E78.2 MIXED HYPERLIPIDEMIA: ICD-10-CM

## 2019-09-21 DIAGNOSIS — I10 ESSENTIAL HYPERTENSION: Primary | Chronic | ICD-10-CM

## 2019-09-21 DIAGNOSIS — R13.10 DYSPHAGIA, UNSPECIFIED TYPE: ICD-10-CM

## 2019-09-21 PROCEDURE — 3074F SYST BP LT 130 MM HG: CPT | Performed by: INTERNAL MEDICINE

## 2019-09-21 PROCEDURE — 99214 OFFICE O/P EST MOD 30 MIN: CPT | Performed by: INTERNAL MEDICINE

## 2019-09-21 PROCEDURE — 3078F DIAST BP <80 MM HG: CPT | Performed by: INTERNAL MEDICINE

## 2019-09-22 VITALS
RESPIRATION RATE: 14 BRPM | BODY MASS INDEX: 27.48 KG/M2 | SYSTOLIC BLOOD PRESSURE: 120 MMHG | WEIGHT: 197 LBS | DIASTOLIC BLOOD PRESSURE: 78 MMHG | HEART RATE: 72 BPM

## 2019-09-22 NOTE — PROGRESS NOTES
Assessment/Plan:   1  Health maintenance -will be receiving influenza vaccine outside the office  2  Health maintenance -candidate for Shingrix vaccine- REVIEW NEXT VISIT  3  Solid food dysphagia- this was an issue previously but he never saw the GI physician because he developed symptoms associated with cholecystitis  Rule out Schatzki's ring  Now being referred back to GI -he will be seeing Dr Agarwal  4  Status post cholecystectomy for calculous cholecystitis  Intraoperative cholangiogram was nondiagnostic  He has recovered  5  Post cholecystectomy diarrhea -improving  6  Incidental left adrenal adenoma -reviewed this whole concept -this was noted at the time of his CT scan with his gallbladder disease  He has nothing clinically to suggest any issues but monitoring closely  BP adequately controlled  No evidence of hypokalemia  Denies any symptoms of pheo- will consider follow-up radiographic study in the future  7  Subclinical carotid stenosis -carotid Doppler unchanged over 6 year span last done in the year 2018   8  Hypertension -adequate control on amlodipine and current dose of ACE-inhibitor  Could consider renin and aldosterone levels in the future with his known adrenal adenoma but clinically appears to be stable  9  Strong family history CAD -most recent stress test normal   10  Hyperlipidemia -had elevated liver enzymes with atorvastatin  Pravastatin was not effective  Much improved on generic Crestor  He knows he needs to lose weight to lower his triglycerides any does not consume excess alcohol    11  Elevated PSA -in January had spiked to 6 5 and then in February 1 0 2 and then in August 3 0 0-she is scheduled for repeat exam with Urology with prior PSA in April of 2020    Scheduled for appointment with GI    Appointment in 6 months with prior chemistry profile cholesterol profile-PSAs are being ordered by Urology    Addendum- patient had endoscopy done on nov 5 by dr Yoselin Franklin- this showed grade 2 esophagitis at the GE junction with stricture in the junction which was dilated normal mucosa of stomach and duodenum  Biopsy read is also to stop the -negative intestinal metaplasia  Superficial fragments with occasional eosinophils consistent with reflux  He was told to stay on pantoprazole 40 milligrams daily and is being seen by GI 1 month- COLTEN CHART NEXT VISIT PATIENT HAD ENDOSCOPY IN November 2019 WITH DR HOLLOWAY    Addendum -received a note also from GI in December that patient's dysphagia resolved status post dilatation he felt that he should continue pantoprazole 40 milligrams daily and be re-evaluated  In 1 year  No problem-specific Assessment & Plan notes found for this encounter  Diagnoses and all orders for this visit:    Essential hypertension    Mixed hyperlipidemia    Dysphagia, unspecified type          Subjective:      Patient ID: Zion Silveira is a 62 y o  male  He is seen for follow-up  He has recovered from his cholecystectomy  He did have some post cholecystectomy diarrhea although this is gradually improving  This is not a major issue  He has not seen any bloody stool  He had been seen by Urology in August   PSA had climbed up to 6 5  In January but repeat PSA in February was 1 2  In August it was 3 0  He is scheduled to be seen again by Urology in 6 months with prior PSA and scheduled then for digital rectal exam       He has had some recurrent solid food dysphagia  Previously we had referred him to the GI group but this was never evaluated because he developed his cholecystitis  He is now being referred back  He does not noted issues with liquids  He has occasional pyrosis but not frequent  We talked about the possibility of a Schatzki's ring referral made in that regard    Labs show triglycerides of 193 LDL 97 HDL 48 cholesterol 169 chemistry profile normal BUN 13 creatinine 1 01  He consumes some alcohol but not extreme amounts    He knows he needs to lose weight  He says he does a large amount of junk food eating while traveling in attending his daughter's dance thor so  Will be cutting back on this wrist repeat lipids ordered prior to next visit  He is ready on fish oil 2 grams daily  We talked about potentially increasing his fish oil but is noted he has some tendency towards post cholecystectomy diarrhea -for now we will observe but pending results at next visit he may need a higher dose  This patient denies any systemic symptoms  Specifically there has been no evidence of fever, night sweats, significant weight loss or significant decrease in appetite  We talked about vaccinations  He will be obtaining influenza vaccine S out the office  He is a candidate for the new zoster vaccine  This can be considered in the future  He remains on a statin  He understands the difference between status associated myalgias and arthralgias from degenerative arthritis  He has known hypertension  BP adequately controlled on current regimen  Avoiding salt and decongestants  Denies simultaneous pounding of heart sweats and headache  He does not use excess nonsteroidals      The following portions of the patient's history were reviewed and updated as appropriate: allergies, current medications, past family history, past medical history, past social history, past surgical history and problem list     Review of Systems   Constitutional: Negative  Respiratory: Negative  Cardiovascular: Negative  Gastrointestinal: Negative  Solid food dysphagia   Endocrine: Negative  Genitourinary: Negative  Musculoskeletal: Negative  Neurological: Negative  Hematological: Negative  Psychiatric/Behavioral: Negative  Objective: There were no vitals taken for this visit  Physical Exam   Constitutional: He is oriented to person, place, and time  He appears well-developed and well-nourished  No distress     HENT: Head: Normocephalic and atraumatic  Right Ear: External ear normal    Left Ear: External ear normal    Nose: Nose normal    Mouth/Throat: Oropharynx is clear and moist  No oropharyngeal exudate  Eyes: Pupils are equal, round, and reactive to light  Conjunctivae and EOM are normal  Right eye exhibits no discharge  Left eye exhibits no discharge  No scleral icterus  Neck: No JVD present  No tracheal deviation present  No thyromegaly present  Cardiovascular: Normal rate, regular rhythm, normal heart sounds and intact distal pulses  Exam reveals no gallop and no friction rub  No murmur heard  Pulmonary/Chest: Effort normal and breath sounds normal  No stridor  No respiratory distress  He has no wheezes  He exhibits no tenderness  Abdominal: Bowel sounds are normal  He exhibits no distension and no mass  There is no tenderness  There is no rebound and no guarding  Genitourinary: Rectal exam shows guaiac negative stool  Musculoskeletal: Normal range of motion  He exhibits no edema, tenderness or deformity  Lymphadenopathy:     He has no cervical adenopathy  Neurological: He is alert and oriented to person, place, and time  He has normal reflexes  He displays normal reflexes  No cranial nerve deficit  He exhibits normal muscle tone  Coordination normal    Skin: Skin is warm and dry  No rash noted  He is not diaphoretic  No erythema  No pallor  Psychiatric: He has a normal mood and affect  His behavior is normal  Judgment and thought content normal    Vitals reviewed

## 2019-09-23 DIAGNOSIS — I10 HYPERTENSION, UNSPECIFIED TYPE: ICD-10-CM

## 2019-09-23 DIAGNOSIS — R97.20 ELEVATED PSA: ICD-10-CM

## 2019-09-23 DIAGNOSIS — E78.5 HYPERLIPIDEMIA, UNSPECIFIED HYPERLIPIDEMIA TYPE: ICD-10-CM

## 2019-09-24 RX ORDER — AMLODIPINE BESYLATE 5 MG/1
5 TABLET ORAL DAILY
Qty: 90 TABLET | Refills: 3 | Status: SHIPPED | OUTPATIENT
Start: 2019-09-24 | End: 2019-09-26 | Stop reason: SDUPTHER

## 2019-09-24 RX ORDER — ENALAPRIL MALEATE 10 MG/1
10 TABLET ORAL 2 TIMES DAILY
Qty: 180 TABLET | Refills: 3 | Status: SHIPPED | OUTPATIENT
Start: 2019-09-24 | End: 2019-09-26 | Stop reason: SDUPTHER

## 2019-09-24 RX ORDER — ROSUVASTATIN CALCIUM 5 MG/1
5 TABLET, COATED ORAL DAILY
Qty: 90 TABLET | Refills: 3 | Status: SHIPPED | OUTPATIENT
Start: 2019-09-24 | End: 2019-09-26 | Stop reason: SDUPTHER

## 2019-09-26 DIAGNOSIS — I10 HYPERTENSION, UNSPECIFIED TYPE: ICD-10-CM

## 2019-09-26 DIAGNOSIS — R97.20 ELEVATED PSA: ICD-10-CM

## 2019-09-26 DIAGNOSIS — E78.5 HYPERLIPIDEMIA, UNSPECIFIED HYPERLIPIDEMIA TYPE: ICD-10-CM

## 2019-09-26 RX ORDER — ENALAPRIL MALEATE 10 MG/1
10 TABLET ORAL 2 TIMES DAILY
Qty: 180 TABLET | Refills: 3 | Status: SHIPPED | OUTPATIENT
Start: 2019-09-26 | End: 2020-10-16

## 2019-09-26 RX ORDER — AMLODIPINE BESYLATE 5 MG/1
5 TABLET ORAL DAILY
Qty: 90 TABLET | Refills: 3 | Status: SHIPPED | OUTPATIENT
Start: 2019-09-26 | End: 2021-01-05 | Stop reason: SDUPTHER

## 2019-09-26 RX ORDER — ROSUVASTATIN CALCIUM 5 MG/1
5 TABLET, COATED ORAL DAILY
Qty: 90 TABLET | Refills: 3 | Status: SHIPPED | OUTPATIENT
Start: 2019-09-26 | End: 2020-11-04

## 2020-03-09 ENCOUNTER — APPOINTMENT (OUTPATIENT)
Dept: LAB | Facility: HOSPITAL | Age: 60
End: 2020-03-09
Payer: COMMERCIAL

## 2020-03-09 ENCOUNTER — TRANSCRIBE ORDERS (OUTPATIENT)
Dept: ADMINISTRATIVE | Facility: HOSPITAL | Age: 60
End: 2020-03-09

## 2020-03-09 DIAGNOSIS — R97.20 ELEVATED PSA: ICD-10-CM

## 2020-03-09 LAB — PSA SERPL-MCNC: 2.5 NG/ML (ref 0–4)

## 2020-03-09 PROCEDURE — 84153 ASSAY OF PSA TOTAL: CPT

## 2020-03-18 ENCOUNTER — TELEPHONE (OUTPATIENT)
Dept: UROLOGY | Facility: CLINIC | Age: 60
End: 2020-03-18

## 2020-03-18 DIAGNOSIS — R97.20 ELEVATED PSA: Primary | ICD-10-CM

## 2020-03-18 NOTE — TELEPHONE ENCOUNTER
Telephone visit*    I called and spoke with Tereso Corado over the phone  I related to him his PSA is back down to normal at 2 5  He has no bothersome urinary symptoms or concerns today  He is comfortable with the plan for one year follow-up with PSA and KARLIE at that visit  He does not need to be brought in acutely, can be scheduled for one year, PSA orders are in  Thanks!

## 2020-03-18 NOTE — TELEPHONE ENCOUNTER
Called and left message for patient to call shaw for his PSA result  Did not see a communication form in chart  Please inform patient his PSA is 2 5

## 2020-03-18 NOTE — TELEPHONE ENCOUNTER
Patient returned call, he does not wish to travel to [address] [id number]  He is asking if he could be called with his most recent psa results [phone]

## 2020-03-25 ENCOUNTER — TELEPHONE (OUTPATIENT)
Dept: INTERNAL MEDICINE CLINIC | Facility: CLINIC | Age: 60
End: 2020-03-25

## 2020-03-25 NOTE — TELEPHONE ENCOUNTER
Pt called to see if you stay want him to came in next Wednesday April 1  He still need to do his BW for this visit

## 2020-03-26 ENCOUNTER — APPOINTMENT (OUTPATIENT)
Dept: LAB | Facility: HOSPITAL | Age: 60
End: 2020-03-26
Payer: COMMERCIAL

## 2020-03-26 DIAGNOSIS — E78.2 MIXED HYPERLIPIDEMIA: ICD-10-CM

## 2020-03-26 DIAGNOSIS — I10 ESSENTIAL HYPERTENSION: Chronic | ICD-10-CM

## 2020-03-26 LAB
ALBUMIN SERPL BCP-MCNC: 4.1 G/DL (ref 3.5–5)
ALP SERPL-CCNC: 46 U/L (ref 46–116)
ALT SERPL W P-5'-P-CCNC: 55 U/L (ref 12–78)
ANION GAP SERPL CALCULATED.3IONS-SCNC: 7 MMOL/L (ref 4–13)
AST SERPL W P-5'-P-CCNC: 21 U/L (ref 5–45)
BILIRUB SERPL-MCNC: 0.8 MG/DL (ref 0.2–1)
BUN SERPL-MCNC: 15 MG/DL (ref 5–25)
CALCIUM SERPL-MCNC: 9.2 MG/DL (ref 8.3–10.1)
CHLORIDE SERPL-SCNC: 103 MMOL/L (ref 100–108)
CHOLEST SERPL-MCNC: 169 MG/DL (ref 50–200)
CO2 SERPL-SCNC: 30 MMOL/L (ref 21–32)
CREAT SERPL-MCNC: 1.09 MG/DL (ref 0.6–1.3)
GFR SERPL CREATININE-BSD FRML MDRD: 72 ML/MIN/1.73SQ M
GLUCOSE P FAST SERPL-MCNC: 101 MG/DL (ref 65–99)
HDLC SERPL-MCNC: 43 MG/DL
LDLC SERPL DIRECT ASSAY-MCNC: 103 MG/DL (ref 0–100)
POTASSIUM SERPL-SCNC: 4.1 MMOL/L (ref 3.5–5.3)
PROT SERPL-MCNC: 7.1 G/DL (ref 6.4–8.2)
SODIUM SERPL-SCNC: 140 MMOL/L (ref 136–145)
TRIGL SERPL-MCNC: 159 MG/DL

## 2020-03-26 PROCEDURE — 83721 ASSAY OF BLOOD LIPOPROTEIN: CPT

## 2020-03-26 PROCEDURE — 80061 LIPID PANEL: CPT

## 2020-03-26 PROCEDURE — 36415 COLL VENOUS BLD VENIPUNCTURE: CPT

## 2020-03-26 PROCEDURE — 80053 COMPREHEN METABOLIC PANEL: CPT

## 2020-04-01 ENCOUNTER — OFFICE VISIT (OUTPATIENT)
Dept: INTERNAL MEDICINE CLINIC | Facility: CLINIC | Age: 60
End: 2020-04-01
Payer: COMMERCIAL

## 2020-04-01 VITALS
HEART RATE: 72 BPM | DIASTOLIC BLOOD PRESSURE: 78 MMHG | BODY MASS INDEX: 27.89 KG/M2 | SYSTOLIC BLOOD PRESSURE: 128 MMHG | WEIGHT: 200 LBS | RESPIRATION RATE: 14 BRPM

## 2020-04-01 DIAGNOSIS — R73.9 HYPERGLYCEMIA: ICD-10-CM

## 2020-04-01 DIAGNOSIS — R97.20 ELEVATED PSA: ICD-10-CM

## 2020-04-01 DIAGNOSIS — R13.10 DYSPHAGIA, UNSPECIFIED TYPE: ICD-10-CM

## 2020-04-01 DIAGNOSIS — Z82.49 FAMILY HISTORY OF PREMATURE CORONARY ARTERY DISEASE: ICD-10-CM

## 2020-04-01 DIAGNOSIS — K21.9 GASTROESOPHAGEAL REFLUX DISEASE, ESOPHAGITIS PRESENCE NOT SPECIFIED: ICD-10-CM

## 2020-04-01 DIAGNOSIS — E78.2 MIXED HYPERLIPIDEMIA: ICD-10-CM

## 2020-04-01 DIAGNOSIS — I10 ESSENTIAL HYPERTENSION: Primary | Chronic | ICD-10-CM

## 2020-04-01 PROCEDURE — 1036F TOBACCO NON-USER: CPT | Performed by: INTERNAL MEDICINE

## 2020-04-01 PROCEDURE — 99214 OFFICE O/P EST MOD 30 MIN: CPT | Performed by: INTERNAL MEDICINE

## 2020-04-01 PROCEDURE — 3074F SYST BP LT 130 MM HG: CPT | Performed by: INTERNAL MEDICINE

## 2020-04-01 PROCEDURE — 3078F DIAST BP <80 MM HG: CPT | Performed by: INTERNAL MEDICINE

## 2020-09-14 DIAGNOSIS — K21.9 GASTROESOPHAGEAL REFLUX DISEASE, ESOPHAGITIS PRESENCE NOT SPECIFIED: Primary | ICD-10-CM

## 2020-09-14 RX ORDER — PANTOPRAZOLE SODIUM 40 MG/1
40 TABLET, DELAYED RELEASE ORAL
Qty: 90 TABLET | Refills: 3 | Status: SHIPPED | OUTPATIENT
Start: 2020-09-14 | End: 2021-07-30

## 2020-10-02 ENCOUNTER — APPOINTMENT (OUTPATIENT)
Dept: LAB | Facility: HOSPITAL | Age: 60
End: 2020-10-02
Payer: COMMERCIAL

## 2020-10-02 DIAGNOSIS — E78.2 MIXED HYPERLIPIDEMIA: ICD-10-CM

## 2020-10-02 DIAGNOSIS — I10 ESSENTIAL HYPERTENSION: Chronic | ICD-10-CM

## 2020-10-02 DIAGNOSIS — R97.20 ELEVATED PSA: ICD-10-CM

## 2020-10-02 LAB
ALBUMIN SERPL BCP-MCNC: 3.8 G/DL (ref 3.5–5)
ALP SERPL-CCNC: 50 U/L (ref 46–116)
ALT SERPL W P-5'-P-CCNC: 43 U/L (ref 12–78)
ANION GAP SERPL CALCULATED.3IONS-SCNC: 5 MMOL/L (ref 4–13)
AST SERPL W P-5'-P-CCNC: 18 U/L (ref 5–45)
BILIRUB SERPL-MCNC: 0.6 MG/DL (ref 0.2–1)
BUN SERPL-MCNC: 15 MG/DL (ref 5–25)
CALCIUM SERPL-MCNC: 8.7 MG/DL (ref 8.3–10.1)
CHLORIDE SERPL-SCNC: 104 MMOL/L (ref 100–108)
CHOLEST SERPL-MCNC: 162 MG/DL (ref 50–200)
CO2 SERPL-SCNC: 30 MMOL/L (ref 21–32)
CREAT SERPL-MCNC: 1.03 MG/DL (ref 0.6–1.3)
GFR SERPL CREATININE-BSD FRML MDRD: 77 ML/MIN/1.73SQ M
GLUCOSE P FAST SERPL-MCNC: 101 MG/DL (ref 65–99)
HDLC SERPL-MCNC: 48 MG/DL
LDLC SERPL DIRECT ASSAY-MCNC: 100 MG/DL (ref 0–100)
POTASSIUM SERPL-SCNC: 4.3 MMOL/L (ref 3.5–5.3)
PROT SERPL-MCNC: 6.9 G/DL (ref 6.4–8.2)
PSA SERPL-MCNC: 1.4 NG/ML (ref 0–4)
SODIUM SERPL-SCNC: 139 MMOL/L (ref 136–145)
TRIGL SERPL-MCNC: 122 MG/DL

## 2020-10-02 PROCEDURE — 83721 ASSAY OF BLOOD LIPOPROTEIN: CPT

## 2020-10-02 PROCEDURE — 36415 COLL VENOUS BLD VENIPUNCTURE: CPT

## 2020-10-02 PROCEDURE — G0103 PSA SCREENING: HCPCS

## 2020-10-02 PROCEDURE — 80061 LIPID PANEL: CPT

## 2020-10-02 PROCEDURE — 80053 COMPREHEN METABOLIC PANEL: CPT

## 2020-10-07 ENCOUNTER — OFFICE VISIT (OUTPATIENT)
Dept: INTERNAL MEDICINE CLINIC | Facility: CLINIC | Age: 60
End: 2020-10-07
Payer: COMMERCIAL

## 2020-10-07 VITALS
WEIGHT: 196.8 LBS | RESPIRATION RATE: 14 BRPM | HEIGHT: 71 IN | TEMPERATURE: 98.5 F | BODY MASS INDEX: 27.55 KG/M2 | SYSTOLIC BLOOD PRESSURE: 130 MMHG | DIASTOLIC BLOOD PRESSURE: 80 MMHG | HEART RATE: 72 BPM

## 2020-10-07 DIAGNOSIS — E78.2 MIXED HYPERLIPIDEMIA: ICD-10-CM

## 2020-10-07 DIAGNOSIS — I10 ESSENTIAL HYPERTENSION: Primary | Chronic | ICD-10-CM

## 2020-10-07 DIAGNOSIS — N52.9 ERECTILE DYSFUNCTION, UNSPECIFIED ERECTILE DYSFUNCTION TYPE: Primary | ICD-10-CM

## 2020-10-07 DIAGNOSIS — R73.9 HYPERGLYCEMIA: ICD-10-CM

## 2020-10-07 LAB — SL AMB POCT HEMOGLOBIN AIC: 5 (ref ?–6.5)

## 2020-10-07 PROCEDURE — 3075F SYST BP GE 130 - 139MM HG: CPT | Performed by: INTERNAL MEDICINE

## 2020-10-07 PROCEDURE — 3079F DIAST BP 80-89 MM HG: CPT | Performed by: INTERNAL MEDICINE

## 2020-10-07 PROCEDURE — 99214 OFFICE O/P EST MOD 30 MIN: CPT | Performed by: INTERNAL MEDICINE

## 2020-10-07 PROCEDURE — 1036F TOBACCO NON-USER: CPT | Performed by: INTERNAL MEDICINE

## 2020-10-07 PROCEDURE — 83036 HEMOGLOBIN GLYCOSYLATED A1C: CPT | Performed by: INTERNAL MEDICINE

## 2020-10-07 PROCEDURE — 3725F SCREEN DEPRESSION PERFORMED: CPT | Performed by: INTERNAL MEDICINE

## 2020-10-08 RX ORDER — SILDENAFIL 100 MG/1
TABLET, FILM COATED ORAL
Qty: 10 TABLET | Refills: 0 | Status: SHIPPED | OUTPATIENT
Start: 2020-10-08

## 2020-10-16 DIAGNOSIS — E78.5 HYPERLIPIDEMIA, UNSPECIFIED HYPERLIPIDEMIA TYPE: ICD-10-CM

## 2020-10-16 DIAGNOSIS — R97.20 ELEVATED PSA: ICD-10-CM

## 2020-10-16 DIAGNOSIS — I10 HYPERTENSION, UNSPECIFIED TYPE: ICD-10-CM

## 2020-10-16 RX ORDER — ENALAPRIL MALEATE 10 MG/1
TABLET ORAL
Qty: 180 TABLET | Refills: 3 | Status: SHIPPED | OUTPATIENT
Start: 2020-10-16 | End: 2021-10-11

## 2020-10-23 ENCOUNTER — TRANSCRIBE ORDERS (OUTPATIENT)
Dept: LAB | Facility: MEDICAL CENTER | Age: 60
End: 2020-10-23

## 2020-10-27 ENCOUNTER — OFFICE VISIT (OUTPATIENT)
Dept: UROLOGY | Facility: CLINIC | Age: 60
End: 2020-10-27
Payer: COMMERCIAL

## 2020-10-27 VITALS
BODY MASS INDEX: 27.34 KG/M2 | DIASTOLIC BLOOD PRESSURE: 80 MMHG | HEART RATE: 68 BPM | TEMPERATURE: 97.5 F | SYSTOLIC BLOOD PRESSURE: 126 MMHG | WEIGHT: 196 LBS

## 2020-10-27 DIAGNOSIS — Z12.5 PROSTATE CANCER SCREENING: Primary | ICD-10-CM

## 2020-10-27 PROCEDURE — 99213 OFFICE O/P EST LOW 20 MIN: CPT | Performed by: PHYSICIAN ASSISTANT

## 2020-10-27 PROCEDURE — 3079F DIAST BP 80-89 MM HG: CPT | Performed by: PHYSICIAN ASSISTANT

## 2020-10-27 PROCEDURE — 3074F SYST BP LT 130 MM HG: CPT | Performed by: PHYSICIAN ASSISTANT

## 2020-11-04 DIAGNOSIS — R97.20 ELEVATED PSA: ICD-10-CM

## 2020-11-04 DIAGNOSIS — E78.5 HYPERLIPIDEMIA, UNSPECIFIED HYPERLIPIDEMIA TYPE: ICD-10-CM

## 2020-11-04 DIAGNOSIS — I10 HYPERTENSION, UNSPECIFIED TYPE: ICD-10-CM

## 2020-11-04 RX ORDER — ROSUVASTATIN CALCIUM 5 MG/1
TABLET, COATED ORAL
Qty: 90 TABLET | Refills: 3 | Status: SHIPPED | OUTPATIENT
Start: 2020-11-04 | End: 2022-02-07 | Stop reason: SDUPTHER

## 2021-01-05 DIAGNOSIS — E78.5 HYPERLIPIDEMIA, UNSPECIFIED HYPERLIPIDEMIA TYPE: ICD-10-CM

## 2021-01-05 DIAGNOSIS — I10 HYPERTENSION, UNSPECIFIED TYPE: ICD-10-CM

## 2021-01-05 DIAGNOSIS — R97.20 ELEVATED PSA: ICD-10-CM

## 2021-01-05 RX ORDER — AMLODIPINE BESYLATE 5 MG/1
5 TABLET ORAL DAILY
Qty: 90 TABLET | Refills: 3 | Status: SHIPPED | OUTPATIENT
Start: 2021-01-05 | End: 2022-03-23 | Stop reason: SDUPTHER

## 2021-04-09 ENCOUNTER — APPOINTMENT (OUTPATIENT)
Dept: LAB | Facility: HOSPITAL | Age: 61
End: 2021-04-09
Payer: COMMERCIAL

## 2021-04-09 DIAGNOSIS — Z12.5 PROSTATE CANCER SCREENING: ICD-10-CM

## 2021-04-09 DIAGNOSIS — E78.2 MIXED HYPERLIPIDEMIA: ICD-10-CM

## 2021-04-09 DIAGNOSIS — I10 ESSENTIAL HYPERTENSION: Chronic | ICD-10-CM

## 2021-04-09 LAB
ALBUMIN SERPL BCP-MCNC: 4.1 G/DL (ref 3.5–5)
ALP SERPL-CCNC: 51 U/L (ref 46–116)
ALT SERPL W P-5'-P-CCNC: 45 U/L (ref 12–78)
ANION GAP SERPL CALCULATED.3IONS-SCNC: 9 MMOL/L (ref 4–13)
AST SERPL W P-5'-P-CCNC: 26 U/L (ref 5–45)
BILIRUB SERPL-MCNC: 0.92 MG/DL (ref 0.2–1)
BUN SERPL-MCNC: 21 MG/DL (ref 5–25)
CALCIUM SERPL-MCNC: 8.9 MG/DL (ref 8.3–10.1)
CHLORIDE SERPL-SCNC: 106 MMOL/L (ref 100–108)
CHOLEST SERPL-MCNC: 174 MG/DL (ref 50–200)
CO2 SERPL-SCNC: 27 MMOL/L (ref 21–32)
CREAT SERPL-MCNC: 1.09 MG/DL (ref 0.6–1.3)
GFR SERPL CREATININE-BSD FRML MDRD: 72 ML/MIN/1.73SQ M
GLUCOSE P FAST SERPL-MCNC: 103 MG/DL (ref 65–99)
HDLC SERPL-MCNC: 49 MG/DL
LDLC SERPL DIRECT ASSAY-MCNC: 104 MG/DL (ref 0–100)
POTASSIUM SERPL-SCNC: 4.1 MMOL/L (ref 3.5–5.3)
PROT SERPL-MCNC: 7.3 G/DL (ref 6.4–8.2)
SODIUM SERPL-SCNC: 142 MMOL/L (ref 136–145)
TRIGL SERPL-MCNC: 133 MG/DL

## 2021-04-09 PROCEDURE — 83721 ASSAY OF BLOOD LIPOPROTEIN: CPT

## 2021-04-09 PROCEDURE — 80061 LIPID PANEL: CPT

## 2021-04-09 PROCEDURE — 80053 COMPREHEN METABOLIC PANEL: CPT

## 2021-04-09 PROCEDURE — 36415 COLL VENOUS BLD VENIPUNCTURE: CPT

## 2021-04-14 ENCOUNTER — OFFICE VISIT (OUTPATIENT)
Dept: INTERNAL MEDICINE CLINIC | Facility: CLINIC | Age: 61
End: 2021-04-14
Payer: COMMERCIAL

## 2021-04-14 VITALS
RESPIRATION RATE: 14 BRPM | SYSTOLIC BLOOD PRESSURE: 130 MMHG | HEIGHT: 71 IN | DIASTOLIC BLOOD PRESSURE: 80 MMHG | HEART RATE: 72 BPM | WEIGHT: 196 LBS | TEMPERATURE: 97.1 F | BODY MASS INDEX: 27.44 KG/M2

## 2021-04-14 DIAGNOSIS — E53.8 VITAMIN B12 DEFICIENCY: ICD-10-CM

## 2021-04-14 DIAGNOSIS — K21.9 GASTROESOPHAGEAL REFLUX DISEASE, UNSPECIFIED WHETHER ESOPHAGITIS PRESENT: Chronic | ICD-10-CM

## 2021-04-14 DIAGNOSIS — I10 ESSENTIAL HYPERTENSION: Primary | Chronic | ICD-10-CM

## 2021-04-14 DIAGNOSIS — R73.9 HYPERGLYCEMIA: ICD-10-CM

## 2021-04-14 DIAGNOSIS — E78.2 MIXED HYPERLIPIDEMIA: ICD-10-CM

## 2021-04-14 DIAGNOSIS — Z82.49 FAMILY HISTORY OF PREMATURE CORONARY ARTERY DISEASE: ICD-10-CM

## 2021-04-14 PROCEDURE — 3075F SYST BP GE 130 - 139MM HG: CPT | Performed by: INTERNAL MEDICINE

## 2021-04-14 PROCEDURE — 99214 OFFICE O/P EST MOD 30 MIN: CPT | Performed by: INTERNAL MEDICINE

## 2021-04-14 PROCEDURE — 1036F TOBACCO NON-USER: CPT | Performed by: INTERNAL MEDICINE

## 2021-04-14 PROCEDURE — 3008F BODY MASS INDEX DOCD: CPT | Performed by: INTERNAL MEDICINE

## 2021-04-14 PROCEDURE — 3725F SCREEN DEPRESSION PERFORMED: CPT | Performed by: INTERNAL MEDICINE

## 2021-04-14 PROCEDURE — 3079F DIAST BP 80-89 MM HG: CPT | Performed by: INTERNAL MEDICINE

## 2021-04-14 NOTE — PROGRESS NOTES
BMI Counseling: Body mass index is 27 34 kg/m²  The BMI is above normal  Nutrition recommendations include encouraging healthy choices of fruits and vegetables and moderation in carbohydrate intake  Exercise recommendations include exercising 3-5 times per week  No pharmacotherapy was ordered  Assessment/Plan:   1  Health maintenance - patient completing COVID vaccine  2  Health maintenance -given prescription for Shingrix vaccine  3  Erectile dysfunction with normal sex drive- was trying Viagra as described previously  4  Family history of malignancy-brother with biliary tract cancer in sister with postmenopausal breast CA  He denies any other malignancy  He had a relatively recent colonoscopy  5  Family history premature CAD-brother had in his 46s although he was a smoker  Stress test in 2018 normal   Controlling risk factors aggressively  On baby aspirin twice weekly consider repeat stress test next visit  6  Elevated PSA-despite in January 2019 at 6 5 but then improved  In August was 3 8,  And then on follow-up was 2 5 and then 1 4  He is seeing urology who is doing follow-up PSAs  7  Mild hyperglycemia-A1c in the  Office previously normal-for repeat fasting sugar prior to next visit  8  Dysphagia with esophagitis -endoscopy done in November 2019 shows grade 2 esophagitis with stricture the junction which was dilated and normal mucosa of stomach and duodenum   Biopsy was read as ulcerative esophagitis with negative intestinal metaplasia   He is now on pantoprazole 40 milligrams daily  Wendy Buitrago did not have large amounts of eosinophils to raise the diagnosis possibility of eosinophilic esophagitis -GI treating him as ulcerative esophagitis -continue current dose of PPI  9  Abnormal skin lesions -these are benign seborrheic keratoses  10 Status post cholecystectomy for calculous cholecystitis   Intraoperative cholangiogram was nondiagnostic   He has recovered  11   Post cholecystectomy diarrhea -improving  12  Incidental left adrenal adenoma -reviewed this whole concept -this was noted at the time of his CT scan with his gallbladder disease  Cain Saxena has nothing clinically to suggest any issues but monitoring closely   BP adequately controlled   No evidence of hypokalemia   Denies any symptoms of pheo- will consider follow-up radiographic study in the future  13  Subclinical carotid stenosis -carotid Doppler unchanged over 6 year span last done in the year 2018   14  Hypertension -adequate control on amlodipine and current dose of ACE-inhibitor   Could consider renin and aldosterone levels in the future with his known adrenal adenoma but clinically appears to be stable  15  Hyperlipidemia -had elevated liver enzymes with atorvastatin   Pravastatin was not effective   Much improved on generic Crestor   He knows he needs to lose weight to lower his triglycerides any does not consume excess alcohol   16  General care -again discussed the social situation at home           MEDICAL REGIMEN:                                                   Sildenafil-100 mg- half tablet as needed, pantoprazole 40 milligrams daily, baby aspirin twice weekly, rosuvastatin 5 milligrams daily, fish oil 2 grams daily, enalapril 10 milligrams b i d  Amlodipine 5 milligrams daily    Seeing neurology group with follow-up PSA  Appointment 6 months with prior chemistry profile cholesterol profile vitamin B12 level ordered because of long-term use of PPI  No problem-specific Assessment & Plan notes found for this encounter  Diagnoses and all orders for this visit:    Essential hypertension  -     Comprehensive metabolic panel; Future  -     Cholesterol, total; Future  -     HDL cholesterol; Future  -     LDL cholesterol, direct; Future  -     Triglycerides;  Future  -     Vitamin B12; Future  -     Hemoglobin A1C; Future    Gastroesophageal reflux disease, unspecified whether esophagitis present    Mixed hyperlipidemia  - Comprehensive metabolic panel; Future  -     Cholesterol, total; Future  -     HDL cholesterol; Future  -     LDL cholesterol, direct; Future  -     Triglycerides; Future  -     Vitamin B12; Future  -     Hemoglobin A1C; Future    Hyperglycemia  -     Comprehensive metabolic panel; Future  -     Cholesterol, total; Future  -     HDL cholesterol; Future  -     LDL cholesterol, direct; Future  -     Triglycerides; Future  -     Vitamin B12; Future  -     Hemoglobin A1C; Future    Family history of premature coronary artery disease    Vitamin B12 deficiency  -     Comprehensive metabolic panel; Future  -     Cholesterol, total; Future  -     HDL cholesterol; Future  -     LDL cholesterol, direct; Future  -     Triglycerides; Future  -     Vitamin B12; Future  -     Hemoglobin A1C; Future          Subjective:      Patient ID: Yang Mejía is a 59 y o  male  Reviewed multiple issues  We talked about his social situation at home in detail  He appears to be coping adequately  We reviewed Shingrix vaccine  He understands this is a 2 part vaccine given 2-6 months apart  He understands it is more effective than prior zoster vaccine and is recommended for his age group  He was given a prescription for this  He knows he needs to wait at least 1 month after his 2nd dose of COVID vaccine  He has started COVID vaccine with 2nd dose scheduled for April the 28  This patient denies any systemic symptoms  Specifically there has been no evidence of fever, night sweats, significant weight loss or significant decrease in appetite  He has known hypertension  BP adequately controlled on current regimen  He is avoiding salt and decongestants  He does not use large amounts of nonsteroidals  He denies any symptoms of sleep apnea  BP adequately controlled currently on ACE-inhibitor plus amlodipine  Most recent medications were reviewed    His most recent laboratory testing was reviewed in detail  Results for orders placed or performed in visit on 04/09/21  -Comprehensive metabolic panel       Result                      Value             Ref Range           Sodium                      142               136 - 145 mm*       Potassium                   4 1               3 5 - 5 3 mm*       Chloride                    106               100 - 108 mm*       CO2                         27                21 - 32 mmol*       ANION GAP                   9                 4 - 13 mmol/L       BUN                         21                5 - 25 mg/dL        Creatinine                  1 09              0 60 - 1 30 *       Glucose, Fasting            103 (H)           65 - 99 mg/dL       Calcium                     8 9               8 3 - 10 1 m*       AST                         26                5 - 45 U/L          ALT                         45                12 - 78 U/L         Alkaline Phosphatase        51                46 - 116 U/L        Total Protein               7 3               6 4 - 8 2 g/*       Albumin                     4 1               3 5 - 5 0 g/*       Total Bilirubin             0 92              0 20 - 1 00 *       eGFR                        72                ml/min/1 73s*  -Cholesterol, total       Result                      Value             Ref Range           Cholesterol                 174               50 - 200 mg/*  -HDL cholesterol       Result                      Value             Ref Range           HDL, Direct                 49                >=40 mg/dL     -LDL cholesterol, direct       Result                      Value             Ref Range           LDL Direct                  104 (H)           0 - 100 mg/dl  -Triglycerides       Result                      Value             Ref Range           Triglycerides               133               <=150 mg/dL      He has a history of significant esophageal reflux prior endoscopy    Most recent endoscopy done in November 2019 showed grade 2 esophagitis with stricture at the junction which was dilated and normal mucosa stomach and duodenum  Biopsy was read as ulcerative esophagitis with negative intestinal metaplasia  Remains on pantoprazole 40 milligrams daily  He did not have large amounts of eosinophils and was not felt to have been eosinophilic esophagitis  GI stated he should be treated as ulcerative esophagitis but they are no longer seeing him    He is seen by urology because of his previously noted elevated PSA  Most recent PSAs have been stable  This patient wanted to know their preferred analgesic agent  Because of their various comorbidities I recommended that this be acetaminophen  This patient has no history of chronic liver disease that would put them at greater risk for use of acetaminophen  This patient may use up to 500-650 mg of acetaminophen at a time and no more than 3 g a day total  Nonsteroidal anti-inflammatory agents have the potential to exacerbate hypertension, hypercoagulability, chronic renal failure, congestive heart failure, and various allergic tendencies  Because of his GI symptoms we wanted use acetaminophen rather than nonsteroidals  The following portions of the patient's history were reviewed and updated as appropriate: allergies, current medications, past family history, past medical history, past social history, past surgical history and problem list     Review of Systems   Constitutional: Negative  HENT: Negative  Respiratory: Negative  Cardiovascular: Negative  Gastrointestinal: Negative  Endocrine: Negative  Genitourinary: Negative  Nocturia x1   Musculoskeletal: Negative  Skin: Negative  Neurological: Negative  Hematological: Negative  Psychiatric/Behavioral: Negative            Objective:      Temp (!) 97 1 °F (36 2 °C) (Tympanic)   Ht 5' 11" (1 803 m)   Wt 88 9 kg (196 lb)   BMI 27 34 kg/m²          Physical Exam  Vitals signs reviewed  Constitutional:       General: He is not in acute distress  Appearance: Normal appearance  He is not ill-appearing, toxic-appearing or diaphoretic  HENT:      Head: Normocephalic and atraumatic  Right Ear: Ear canal and external ear normal       Left Ear: Ear canal and external ear normal       Nose: Nose normal  No congestion or rhinorrhea  Mouth/Throat:      Mouth: Mucous membranes are moist       Pharynx: Oropharynx is clear  No oropharyngeal exudate or posterior oropharyngeal erythema  Eyes:      General: No scleral icterus  Right eye: No discharge  Left eye: No discharge  Extraocular Movements: Extraocular movements intact  Pupils: Pupils are equal, round, and reactive to light  Neck:      Musculoskeletal: Normal range of motion and neck supple  No neck rigidity or muscular tenderness  Vascular: No carotid bruit  Cardiovascular:      Rate and Rhythm: Normal rate and regular rhythm  Pulses: Normal pulses  Heart sounds: Normal heart sounds  No murmur  No friction rub  No gallop  Pulmonary:      Effort: Pulmonary effort is normal  No respiratory distress  Breath sounds: Normal breath sounds  No stridor  No wheezing, rhonchi or rales  Chest:      Chest wall: No tenderness  Abdominal:      General: Abdomen is flat  Bowel sounds are normal  There is no distension  Palpations: Abdomen is soft  There is no mass  Tenderness: There is no abdominal tenderness  There is no right CVA tenderness, left CVA tenderness, guarding or rebound  Hernia: No hernia is present  Musculoskeletal: Normal range of motion  General: No swelling, tenderness, deformity or signs of injury  Right lower leg: No edema  Left lower leg: No edema  Lymphadenopathy:      Cervical: No cervical adenopathy  Skin:     General: Skin is warm and dry  Coloration: Skin is not jaundiced or pale        Findings: No bruising, erythema, lesion or rash  Comments:  Benign nevi   Neurological:      General: No focal deficit present  Mental Status: He is alert and oriented to person, place, and time  Mental status is at baseline  Cranial Nerves: No cranial nerve deficit  Sensory: No sensory deficit  Motor: No weakness  Coordination: Coordination normal       Gait: Gait normal       Deep Tendon Reflexes: Reflexes normal    Psychiatric:         Mood and Affect: Mood normal          Behavior: Behavior normal          Thought Content:  Thought content normal          Judgment: Judgment normal

## 2021-07-30 DIAGNOSIS — K21.9 GASTROESOPHAGEAL REFLUX DISEASE: ICD-10-CM

## 2021-07-30 RX ORDER — PANTOPRAZOLE SODIUM 40 MG/1
TABLET, DELAYED RELEASE ORAL
Qty: 90 TABLET | Refills: 3 | Status: SHIPPED | OUTPATIENT
Start: 2021-07-30 | End: 2022-04-26

## 2021-10-11 DIAGNOSIS — E78.5 HYPERLIPIDEMIA, UNSPECIFIED HYPERLIPIDEMIA TYPE: ICD-10-CM

## 2021-10-11 DIAGNOSIS — I10 HYPERTENSION, UNSPECIFIED TYPE: ICD-10-CM

## 2021-10-11 DIAGNOSIS — R97.20 ELEVATED PSA: ICD-10-CM

## 2021-10-11 RX ORDER — ENALAPRIL MALEATE 10 MG/1
TABLET ORAL
Qty: 180 TABLET | Refills: 3 | Status: SHIPPED | OUTPATIENT
Start: 2021-10-11

## 2021-10-15 ENCOUNTER — APPOINTMENT (OUTPATIENT)
Dept: LAB | Facility: HOSPITAL | Age: 61
End: 2021-10-15
Payer: COMMERCIAL

## 2021-10-15 DIAGNOSIS — I10 ESSENTIAL HYPERTENSION: Chronic | ICD-10-CM

## 2021-10-15 DIAGNOSIS — E78.2 MIXED HYPERLIPIDEMIA: ICD-10-CM

## 2021-10-15 DIAGNOSIS — E53.8 VITAMIN B12 DEFICIENCY: ICD-10-CM

## 2021-10-15 DIAGNOSIS — R73.9 HYPERGLYCEMIA: ICD-10-CM

## 2021-10-15 LAB
ALBUMIN SERPL BCP-MCNC: 3.9 G/DL (ref 3.5–5)
ALP SERPL-CCNC: 48 U/L (ref 46–116)
ALT SERPL W P-5'-P-CCNC: 51 U/L (ref 12–78)
ANION GAP SERPL CALCULATED.3IONS-SCNC: 6 MMOL/L (ref 4–13)
AST SERPL W P-5'-P-CCNC: 21 U/L (ref 5–45)
BILIRUB SERPL-MCNC: 0.53 MG/DL (ref 0.2–1)
BUN SERPL-MCNC: 18 MG/DL (ref 5–25)
CALCIUM SERPL-MCNC: 8.8 MG/DL (ref 8.3–10.1)
CHLORIDE SERPL-SCNC: 107 MMOL/L (ref 100–108)
CHOLEST SERPL-MCNC: 154 MG/DL (ref 50–200)
CO2 SERPL-SCNC: 29 MMOL/L (ref 21–32)
CREAT SERPL-MCNC: 1.02 MG/DL (ref 0.6–1.3)
EST. AVERAGE GLUCOSE BLD GHB EST-MCNC: 91 MG/DL
GFR SERPL CREATININE-BSD FRML MDRD: 78 ML/MIN/1.73SQ M
GLUCOSE P FAST SERPL-MCNC: 116 MG/DL (ref 65–99)
HBA1C MFR BLD: 4.8 %
HDLC SERPL-MCNC: 46 MG/DL
LDLC SERPL DIRECT ASSAY-MCNC: 87 MG/DL (ref 0–100)
POTASSIUM SERPL-SCNC: 4.2 MMOL/L (ref 3.5–5.3)
PROT SERPL-MCNC: 6.9 G/DL (ref 6.4–8.2)
SODIUM SERPL-SCNC: 142 MMOL/L (ref 136–145)
TRIGL SERPL-MCNC: 95 MG/DL
VIT B12 SERPL-MCNC: 430 PG/ML (ref 100–900)

## 2021-10-15 PROCEDURE — 80061 LIPID PANEL: CPT

## 2021-10-15 PROCEDURE — 83721 ASSAY OF BLOOD LIPOPROTEIN: CPT

## 2021-10-15 PROCEDURE — 82607 VITAMIN B-12: CPT

## 2021-10-15 PROCEDURE — 36415 COLL VENOUS BLD VENIPUNCTURE: CPT

## 2021-10-15 PROCEDURE — 83036 HEMOGLOBIN GLYCOSYLATED A1C: CPT

## 2021-10-15 PROCEDURE — 80053 COMPREHEN METABOLIC PANEL: CPT

## 2021-10-18 ENCOUNTER — TELEPHONE (OUTPATIENT)
Dept: INTERNAL MEDICINE CLINIC | Facility: CLINIC | Age: 61
End: 2021-10-18
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
## 2021-10-18 RX ORDER — DOXYCYCLINE 100 MG/1
TABLET ORAL
Qty: 2 TABLET | Refills: 0 | Status: SHIPPED | OUTPATIENT
Start: 2021-10-18 | End: 2021-10-19

## 2021-10-20 ENCOUNTER — OFFICE VISIT (OUTPATIENT)
Dept: INTERNAL MEDICINE CLINIC | Facility: CLINIC | Age: 61
End: 2021-10-20
Payer: COMMERCIAL

## 2021-10-20 VITALS
HEIGHT: 71 IN | BODY MASS INDEX: 27.22 KG/M2 | WEIGHT: 194.4 LBS | SYSTOLIC BLOOD PRESSURE: 120 MMHG | DIASTOLIC BLOOD PRESSURE: 78 MMHG | RESPIRATION RATE: 12 BRPM | HEART RATE: 72 BPM

## 2021-10-20 DIAGNOSIS — R97.20 ELEVATED PSA: ICD-10-CM

## 2021-10-20 DIAGNOSIS — K21.9 GASTROESOPHAGEAL REFLUX DISEASE, UNSPECIFIED WHETHER ESOPHAGITIS PRESENT: Chronic | ICD-10-CM

## 2021-10-20 DIAGNOSIS — Z82.49 FAMILY HISTORY OF PREMATURE CORONARY ARTERY DISEASE: ICD-10-CM

## 2021-10-20 DIAGNOSIS — E78.2 MIXED HYPERLIPIDEMIA: Primary | ICD-10-CM

## 2021-10-20 DIAGNOSIS — R73.9 HYPERGLYCEMIA: ICD-10-CM

## 2021-10-20 PROCEDURE — 99214 OFFICE O/P EST MOD 30 MIN: CPT | Performed by: INTERNAL MEDICINE

## 2021-10-20 PROCEDURE — 3725F SCREEN DEPRESSION PERFORMED: CPT | Performed by: INTERNAL MEDICINE

## 2021-10-25 ENCOUNTER — TELEPHONE (OUTPATIENT)
Dept: OTHER | Facility: OTHER | Age: 61
End: 2021-10-25

## 2021-10-26 ENCOUNTER — APPOINTMENT (OUTPATIENT)
Dept: LAB | Facility: HOSPITAL | Age: 61
End: 2021-10-26
Payer: COMMERCIAL

## 2021-10-26 DIAGNOSIS — Z12.5 SPECIAL SCREENING FOR MALIGNANT NEOPLASM OF PROSTATE: ICD-10-CM

## 2021-10-26 LAB — PSA SERPL-MCNC: 1 NG/ML (ref 0–4)

## 2021-10-26 PROCEDURE — 36415 COLL VENOUS BLD VENIPUNCTURE: CPT

## 2021-10-26 PROCEDURE — G0103 PSA SCREENING: HCPCS

## 2021-10-28 ENCOUNTER — OFFICE VISIT (OUTPATIENT)
Dept: UROLOGY | Facility: CLINIC | Age: 61
End: 2021-10-28
Payer: COMMERCIAL

## 2021-10-28 VITALS
BODY MASS INDEX: 27.13 KG/M2 | HEIGHT: 71 IN | HEART RATE: 78 BPM | DIASTOLIC BLOOD PRESSURE: 72 MMHG | WEIGHT: 193.8 LBS | SYSTOLIC BLOOD PRESSURE: 136 MMHG

## 2021-10-28 DIAGNOSIS — N40.0 BENIGN PROSTATIC HYPERPLASIA, UNSPECIFIED WHETHER LOWER URINARY TRACT SYMPTOMS PRESENT: Chronic | ICD-10-CM

## 2021-10-28 DIAGNOSIS — Z12.5 PROSTATE CANCER SCREENING: Primary | ICD-10-CM

## 2021-10-28 PROBLEM — N52.8 OTHER MALE ERECTILE DYSFUNCTION: Status: ACTIVE | Noted: 2021-10-28

## 2021-10-28 LAB
SL AMB  POCT GLUCOSE, UA: NORMAL
SL AMB LEUKOCYTE ESTERASE,UA: NORMAL
SL AMB POCT BILIRUBIN,UA: NORMAL
SL AMB POCT BLOOD,UA: NORMAL
SL AMB POCT CLARITY,UA: CLEAR
SL AMB POCT COLOR,UA: YELLOW
SL AMB POCT KETONES,UA: NORMAL
SL AMB POCT NITRITE,UA: NORMAL
SL AMB POCT PH,UA: 7
SL AMB POCT SPECIFIC GRAVITY,UA: 1.02
SL AMB POCT URINE PROTEIN: NORMAL
SL AMB POCT UROBILINOGEN: 0.2

## 2021-10-28 PROCEDURE — 99212 OFFICE O/P EST SF 10 MIN: CPT | Performed by: NURSE PRACTITIONER

## 2021-10-28 PROCEDURE — 1036F TOBACCO NON-USER: CPT | Performed by: NURSE PRACTITIONER

## 2021-10-28 PROCEDURE — 3008F BODY MASS INDEX DOCD: CPT | Performed by: NURSE PRACTITIONER

## 2021-10-28 PROCEDURE — 81002 URINALYSIS NONAUTO W/O SCOPE: CPT | Performed by: NURSE PRACTITIONER

## 2022-02-07 DIAGNOSIS — E78.5 HYPERLIPIDEMIA, UNSPECIFIED HYPERLIPIDEMIA TYPE: ICD-10-CM

## 2022-02-07 DIAGNOSIS — R97.20 ELEVATED PSA: ICD-10-CM

## 2022-02-07 DIAGNOSIS — I10 HYPERTENSION, UNSPECIFIED TYPE: ICD-10-CM

## 2022-02-08 RX ORDER — ROSUVASTATIN CALCIUM 5 MG/1
5 TABLET, COATED ORAL DAILY
Qty: 90 TABLET | Refills: 3 | Status: SHIPPED | OUTPATIENT
Start: 2022-02-08

## 2022-03-23 DIAGNOSIS — I10 HYPERTENSION, UNSPECIFIED TYPE: ICD-10-CM

## 2022-03-23 DIAGNOSIS — E78.5 HYPERLIPIDEMIA, UNSPECIFIED HYPERLIPIDEMIA TYPE: ICD-10-CM

## 2022-03-23 DIAGNOSIS — R97.20 ELEVATED PSA: ICD-10-CM

## 2022-03-25 RX ORDER — AMLODIPINE BESYLATE 5 MG/1
5 TABLET ORAL DAILY
Qty: 90 TABLET | Refills: 3 | Status: SHIPPED | OUTPATIENT
Start: 2022-03-25 | End: 2022-04-01 | Stop reason: SDUPTHER

## 2022-04-01 DIAGNOSIS — E78.5 HYPERLIPIDEMIA, UNSPECIFIED HYPERLIPIDEMIA TYPE: ICD-10-CM

## 2022-04-01 DIAGNOSIS — R97.20 ELEVATED PSA: ICD-10-CM

## 2022-04-01 DIAGNOSIS — I10 HYPERTENSION, UNSPECIFIED TYPE: ICD-10-CM

## 2022-04-01 RX ORDER — AMLODIPINE BESYLATE 5 MG/1
5 TABLET ORAL DAILY
Qty: 90 TABLET | Refills: 3 | Status: SHIPPED | OUTPATIENT
Start: 2022-04-01

## 2022-04-01 NOTE — TELEPHONE ENCOUNTER
PT NEEDS ANOTHER REFILL BECAUSE IT WAS ORGINALLY SUPPOSED TO BE SENT TO [facility]     LOCAL PHARMACY ONLY ALLOWED 30 DAY SUPPLY

## 2022-04-26 ENCOUNTER — OFFICE VISIT (OUTPATIENT)
Dept: INTERNAL MEDICINE CLINIC | Facility: CLINIC | Age: 62
End: 2022-04-26
Payer: COMMERCIAL

## 2022-04-26 VITALS
WEIGHT: 196.6 LBS | BODY MASS INDEX: 27.52 KG/M2 | SYSTOLIC BLOOD PRESSURE: 128 MMHG | RESPIRATION RATE: 16 BRPM | HEART RATE: 65 BPM | OXYGEN SATURATION: 99 % | DIASTOLIC BLOOD PRESSURE: 82 MMHG | HEIGHT: 71 IN

## 2022-04-26 DIAGNOSIS — E55.9 VITAMIN D DEFICIENCY: ICD-10-CM

## 2022-04-26 DIAGNOSIS — I10 ESSENTIAL HYPERTENSION: Primary | ICD-10-CM

## 2022-04-26 DIAGNOSIS — E78.5 HYPERLIPIDEMIA, UNSPECIFIED HYPERLIPIDEMIA TYPE: ICD-10-CM

## 2022-04-26 DIAGNOSIS — K21.00 GASTROESOPHAGEAL REFLUX DISEASE WITH ESOPHAGITIS WITHOUT HEMORRHAGE: ICD-10-CM

## 2022-04-26 DIAGNOSIS — N40.0 BENIGN PROSTATIC HYPERPLASIA WITHOUT LOWER URINARY TRACT SYMPTOMS: ICD-10-CM

## 2022-04-26 DIAGNOSIS — Z13.29 SCREENING FOR THYROID DISORDER: ICD-10-CM

## 2022-04-26 DIAGNOSIS — Z82.49 FAMILY HISTORY OF PREMATURE CORONARY ARTERY DISEASE: ICD-10-CM

## 2022-04-26 PROCEDURE — 1036F TOBACCO NON-USER: CPT | Performed by: INTERNAL MEDICINE

## 2022-04-26 PROCEDURE — 99396 PREV VISIT EST AGE 40-64: CPT | Performed by: INTERNAL MEDICINE

## 2022-04-26 PROCEDURE — 3008F BODY MASS INDEX DOCD: CPT | Performed by: INTERNAL MEDICINE

## 2022-04-26 NOTE — PROGRESS NOTES
Assessment/Plan:    #HLD  - HDL 49  -continue crestor, omega 3  -exercise by riding bike, walking dog, and dirt bike riding  -atorvasrtatin, pravastatin caused transaminiitis and not effective  -on aspirin twice a week  -brother with MI in his 46s    #Elevated PSA  -PSA up to 6 5 at one time  -now down to 1  -will continue to trend  -possibly elevated due to gallbladder inflammation and removal at that time    #Esophagitis  -EGD in 2019 with grade 2 esophagitis and stricture at junction and underwent dilation  -was on pantoprazole but stopped and doing well with diet control and taking pantoprazole prn    #Left Adrenal Adenoma  -continue to monitor on imaging    #HTN  -BP stable on amlodipine, enalapril    #Erectile Dysfunction  -remains on viagra    #Health Maintenance  -routine labs and followup 6 months  -covid and shingrix up to date  -covid booster pending  -colonoscopy due 2028  -works at [employer]    No problem-specific [address] [place] [address] notes found for this encounter  Diagnoses and all orders for this visit:    Essential hypertension  -     CBC and differential; Future  -     Comprehensive metabolic panel; Future    Gastroesophageal reflux disease with esophagitis without hemorrhage  -     CBC and differential; Future  -     Comprehensive metabolic panel; Future    Hyperlipidemia, unspecified hyperlipidemia type  -     CBC and differential; Future  -     Comprehensive metabolic panel; Future  -     Lipid Panel With Direct LDL; Future    Family history of premature coronary artery disease  -     CBC and differential; Future  -     Comprehensive metabolic panel; Future    Benign prostatic hyperplasia without lower urinary tract symptoms  -     CBC and differential; Future  -     Comprehensive metabolic panel; Future  -     PSA, total and free; Future    Screening for thyroid disorder  -     CBC and differential; Future  -     Comprehensive metabolic panel;  Future  -     TSH, 3rd generation with Free T4 reflex; Future    Vitamin D deficiency  -     CBC and differential; Future  -     Comprehensive metabolic panel; Future  -     Vitamin D 25 hydroxy; Future            Current Outpatient Medications:     acetaminophen-codeine (TYLENOL with CODEINE #3) 300-30 MG per tablet, Take by mouth (Patient not taking: Reported on 10/28/2021), Disp: , Rfl:     amLODIPine (NORVASC) 5 mg tablet, Take 1 tablet (5 mg total) by mouth daily, Disp: 90 tablet, Rfl: 3    aspirin 81 MG tablet, Take 81 mg by mouth 2 (two) times a week , Disp: , Rfl:     enalapril (VASOTEC) 10 mg tablet, TAKE 1 TABLET TWICE A DAY, Disp: 180 tablet, Rfl: 3    Omega-3 Fatty Acids (FISH OIL) 1,000 mg, Take 1 capsule by mouth 2 (two) times a day  , Disp: , Rfl:     oxyCODONE-acetaminophen (PERCOCET) 5-325 mg per tablet, Take 1 tablet by mouth every 4 (four) hours as needed for moderate pain for up to 10 doses Max Daily Amount: 6 tablets (Patient not taking: Reported on 2/11/2019), Disp: 20 tablet, Rfl: 0    rosuvastatin (CRESTOR) 5 mg tablet, Take 1 tablet (5 mg total) by mouth daily, Disp: 90 tablet, Rfl: 3    sildenafil (VIAGRA) 100 mg tablet, Take 1/2 tab one hour before sex, Disp: 10 tablet, Rfl: 0    Subjective:      Patient ID: Marco Emerson is a 57 y o  male  HPI     Patient presents for routine checkup  Denies any recent hospitalizations or surgeries  His LDL was 103 and HDL 49 currently controlled with Crestor  Recommended that he resume his baby aspirin twice a week  He no longer has any symptoms of GERD however whenever he does get a flare up frequently and will take pantoprazole  He is up-to-date on his COVID shot and Shingrix vaccine  He is due for the COVID booster and we encouraged him to obtain this  He is up-to-date on his colonoscopy  Completed a stress test in 2018  His father had an MI at age 77  Patient's blood pressure is currently stable on amlodipine and enalapril  We will continue with this    He will return to care in 6 months  The following portions of the patient's history were reviewed and updated as appropriate: allergies, current medications, past family history, past medical history, past social history, past surgical history and problem list     Review of Systems   Constitutional: Negative for activity change, appetite change, fatigue and fever  HENT: Negative for congestion, ear pain, hearing loss, sore throat and tinnitus  Eyes: Negative for photophobia, pain and visual disturbance  Respiratory: Negative for cough, shortness of breath and wheezing  Cardiovascular: Negative for chest pain, palpitations and leg swelling  Gastrointestinal: Negative for abdominal distention, abdominal pain, constipation, diarrhea, nausea and vomiting  Genitourinary: Negative for difficulty urinating, frequency and hematuria  Musculoskeletal: Negative for arthralgias, back pain, joint swelling, neck pain and neck stiffness  Skin: Negative for color change, pallor, rash and wound  Neurological: Negative for dizziness, tremors, numbness and headaches  Hematological: Does not bruise/bleed easily  Objective:      /82   Pulse 65   Resp 16   Ht 5' 11" (1 803 m)   Wt 89 2 kg (196 lb 9 6 oz)   SpO2 99%   BMI 27 42 kg/m²          Physical Exam  Vitals reviewed  Constitutional:       Appearance: Normal appearance  He is well-developed  HENT:      Head: Normocephalic and atraumatic  Right Ear: Tympanic membrane, ear canal and external ear normal  There is no impacted cerumen  Left Ear: Tympanic membrane, ear canal and external ear normal  There is no impacted cerumen  Eyes:      Conjunctiva/sclera: Conjunctivae normal       Pupils: Pupils are equal, round, and reactive to light  Neck:      Thyroid: No thyromegaly  Vascular: No JVD  Cardiovascular:      Rate and Rhythm: Normal rate and regular rhythm  Pulses: Normal pulses        Heart sounds: Normal heart sounds  No murmur heard  Pulmonary:      Effort: Pulmonary effort is normal  No respiratory distress  Breath sounds: Normal breath sounds  No stridor  No wheezing, rhonchi or rales  Abdominal:      General: Bowel sounds are normal  There is no distension  Palpations: Abdomen is soft  There is no mass  Tenderness: There is no abdominal tenderness  There is no right CVA tenderness, left CVA tenderness, guarding or rebound  Musculoskeletal:         General: No tenderness or deformity  Normal range of motion  Cervical back: Normal range of motion and neck supple  Right lower leg: No edema  Left lower leg: No edema  Lymphadenopathy:      Cervical: No cervical adenopathy  Skin:     General: Skin is warm and dry  Findings: No erythema or rash  Neurological:      Mental Status: He is alert and oriented to person, place, and time  Mental status is at baseline  Deep Tendon Reflexes: Reflexes are normal and symmetric  This note was completed in part utilizing [employer] direct voice recognition software  Grammatical errors, random word insertion, spelling mistakes, and incomplete sentences may be an occasional consequence of the system secondary to software limitations, ambient noise and hardware issues  At the time of dictation, efforts were made to edit, clarify and /or correct errors  Please read the chart carefully and recognize, using context, where substitutions have occurred  If you have any questions or concerns about the context, text or information contained within the body of this dictation, please contact myself, the provider, for further clarification

## 2022-05-20 ENCOUNTER — TELEPHONE (OUTPATIENT)
Dept: INTERNAL MEDICINE CLINIC | Facility: CLINIC | Age: 62
End: 2022-05-20

## 2022-05-20 DIAGNOSIS — Z20.822 ENCOUNTER FOR LABORATORY TESTING FOR COVID-19 VIRUS: Primary | ICD-10-CM

## 2022-05-22 DIAGNOSIS — Z20.822 ENCOUNTER FOR LABORATORY TESTING FOR COVID-19 VIRUS: ICD-10-CM

## 2022-05-22 PROCEDURE — U0003 INFECTIOUS AGENT DETECTION BY NUCLEIC ACID (DNA OR RNA); SEVERE ACUTE RESPIRATORY SYNDROME CORONAVIRUS 2 (SARS-COV-2) (CORONAVIRUS DISEASE [COVID-19]), AMPLIFIED PROBE TECHNIQUE, MAKING USE OF HIGH THROUGHPUT TECHNOLOGIES AS DESCRIBED BY CMS-2020-01-R: HCPCS | Performed by: INTERNAL MEDICINE

## 2022-05-22 PROCEDURE — U0005 INFEC AGEN DETEC AMPLI PROBE: HCPCS | Performed by: INTERNAL MEDICINE

## 2022-05-23 ENCOUNTER — TELEPHONE (OUTPATIENT)
Dept: INTERNAL MEDICINE CLINIC | Facility: CLINIC | Age: 62
End: 2022-05-23

## 2022-05-23 LAB — SARS-COV-2 RNA RESP QL NAA+PROBE: NEGATIVE

## 2022-05-23 NOTE — TELEPHONE ENCOUNTER
----- Message from Mario Maxwell DO sent at 5/23/2022 12:56 PM EDT -----  Please let patient know his covid test is negative

## 2022-10-12 PROBLEM — Z12.5 PROSTATE CANCER SCREENING: Status: RESOLVED | Noted: 2021-10-28 | Resolved: 2022-10-12

## 2022-11-01 ENCOUNTER — OFFICE VISIT (OUTPATIENT)
Dept: INTERNAL MEDICINE CLINIC | Facility: CLINIC | Age: 62
End: 2022-11-01

## 2022-11-01 VITALS
WEIGHT: 193 LBS | HEIGHT: 71 IN | HEART RATE: 67 BPM | DIASTOLIC BLOOD PRESSURE: 84 MMHG | BODY MASS INDEX: 27.02 KG/M2 | OXYGEN SATURATION: 97 % | RESPIRATION RATE: 15 BRPM | SYSTOLIC BLOOD PRESSURE: 132 MMHG

## 2022-11-01 DIAGNOSIS — H61.23 BILATERAL HEARING LOSS DUE TO CERUMEN IMPACTION: ICD-10-CM

## 2022-11-01 DIAGNOSIS — R97.20 ELEVATED PSA: Primary | ICD-10-CM

## 2022-11-01 DIAGNOSIS — E78.5 HYPERLIPIDEMIA, UNSPECIFIED HYPERLIPIDEMIA TYPE: ICD-10-CM

## 2022-11-01 DIAGNOSIS — I10 HYPERTENSION, UNSPECIFIED TYPE: ICD-10-CM

## 2022-11-01 RX ORDER — CHOLECALCIFEROL (VITAMIN D3) 50 MCG
2000 TABLET ORAL DAILY
COMMUNITY

## 2022-11-01 NOTE — PROGRESS NOTES
Assessment/Plan:    #HLD  -LDL 78 HDL 48 triglycerides 189  -continue crestor, omega 3  -exercise by riding bike, walking dog, and dirt bike riding  -atorvastatin, pravastatin caused transaminitis and not effective  -on aspirin twice a week  -brother with MI in his 46s  -encouraged to cut back on red meat and to cut back on fatty foods  -encouraged to start using exercise bike at home    #Cerumen Impaction  -in b/l ears and disimpacted today without issues  -encouraged to use hydrogen peroxide once a week    #Vitamin D Deficiency  -low at 23  -will start on 2000 units daily     #Elevated PSA  -PSA up to 6 5 at one time  -now down to 1 34  -will continue to trend  -possibly elevated due to gallbladder inflammation and removal at that time  -KARLIE today with BPH but stable otherwise  -rides his dirt bike often and recently got a [employer] bike which may elevated PSA as well     #Esophagitis  -EGD in 2019 with grade 2 esophagitis and stricture at junction and underwent dilation  -was on pantoprazole but stopped and doing well with diet control and taking pantoprazole prn     #Left Adrenal Adenoma  -OBTAIN CT ADRENAL GLAND PROTOCOL NEXT VISIT     #HTN  -BP stable on amlodipine, enalapril and will continue    #Erectile Dysfunction  -remains on viagra    #Health Maintenance  -routine labs and followup 6 months  -covid and shingrix up to date  -covid bivalent pending  -flu vaccine up to date  -colonoscopy due 2028  -works at [employer] and plans to retire in July 2023, will focus on woodworking and dirt biking and skiing    No problem-specific Assessment & Plan notes found for this encounter  Diagnoses and all orders for this visit:    Elevated PSA  -     CBC and differential; Future  -     Comprehensive metabolic panel; Future  -     PSA, total and free; Future    Hyperlipidemia, unspecified hyperlipidemia type  -     CBC and differential; Future  -     Comprehensive metabolic panel;  Future  -     Lipid Panel with Direct LDL reflex; Future    Hypertension, unspecified type  -     CBC and differential; Future  -     Comprehensive metabolic panel; Future    Bilateral hearing loss due to cerumen impaction  -     CBC and differential; Future  -     Comprehensive metabolic panel; Future  -     Ear cerumen removal    Other orders  -     Cholecalciferol (Vitamin D) 50 MCG (2000 UT) tablet; Take 2,000 Units by mouth daily            Current Outpatient Medications:   •  Cholecalciferol (Vitamin D) 50 MCG (2000 UT) tablet, Take 2,000 Units by mouth daily, Disp: , Rfl:   •  amLODIPine (NORVASC) 5 mg tablet, Take 1 tablet (5 mg total) by mouth daily, Disp: 90 tablet, Rfl: 3  •  aspirin 81 MG tablet, Take 81 mg by mouth 2 (two) times a week , Disp: , Rfl:   •  enalapril (VASOTEC) 10 mg tablet, Take 1 tablet (10 mg total) by mouth 2 (two) times a day, Disp: 60 tablet, Rfl: 0  •  Omega-3 Fatty Acids (FISH OIL) 1,000 mg, Take 1 capsule by mouth 2 (two) times a day  , Disp: , Rfl:   •  rosuvastatin (CRESTOR) 5 mg tablet, Take 1 tablet (5 mg total) by mouth daily, Disp: 90 tablet, Rfl: 3  •  sildenafil (VIAGRA) 100 mg tablet, Take 1/2 tab one hour before sex, Disp: 10 tablet, Rfl: 0    Subjective:      Patient ID: Andrei Torres is a 57 y o  male  HPI     Patient presents for routine checkup  Denies any recent hospitalizations or surgeries  His PSA was previously elevated up to 6 5 at 1 time  It is now down to 1 34  KARLIE examination today revealed BPH but no signs of nodularity  We will continue to trend his PSA  Was noted to have cerumen impaction in his bilateral ears and we disimpacted at bedside today without any problems  TSH is stable at 1 1  His LDL is 178 and HDL 48 with an elevated triglycerides  Remains on Crestor and Omega 3  Encouraged him to diet and exercise and cut back on fatty foods  His blood pressure stable today on amlodipine and enalapril  He will continue with this  He is up-to-date on his flu shot    He will obtain the COVID booster at his pharmacy  His vitamin-D was low at 23 and he will start taking 2000 units daily  He will return to care in 6 months  The following portions of the patient's history were reviewed and updated as appropriate: allergies, current medications, past family history, past medical history, past social history, past surgical history and problem list     Review of Systems   Constitutional: Negative for activity change, appetite change, fatigue and fever  HENT: Positive for hearing loss  Negative for congestion, ear pain, sore throat and tinnitus  Eyes: Negative for photophobia, pain and visual disturbance  Respiratory: Negative for cough, shortness of breath and wheezing  Cardiovascular: Negative for chest pain, palpitations and leg swelling  Gastrointestinal: Negative for abdominal distention, abdominal pain, constipation, diarrhea, nausea and vomiting  Genitourinary: Negative for difficulty urinating, frequency and hematuria  Musculoskeletal: Negative for arthralgias, back pain, gait problem, joint swelling, myalgias, neck pain and neck stiffness  Skin: Negative for color change, pallor, rash and wound  Neurological: Negative for dizziness, tremors, numbness and headaches  Hematological: Does not bruise/bleed easily  Objective:      /84   Pulse 67   Resp 15   Ht 5' 11" (1 803 m)   Wt 87 5 kg (193 lb)   SpO2 97%   BMI 26 92 kg/m²          Physical Exam  Vitals reviewed  Constitutional:       Appearance: Normal appearance  He is well-developed  HENT:      Head: Normocephalic and atraumatic  Right Ear: Tympanic membrane, ear canal and external ear normal  There is impacted cerumen  Left Ear: Tympanic membrane, ear canal and external ear normal  There is impacted cerumen  Nose: Nose normal       Mouth/Throat:      Pharynx: Oropharynx is clear  No oropharyngeal exudate or posterior oropharyngeal erythema     Eyes: Conjunctiva/sclera: Conjunctivae normal       Pupils: Pupils are equal, round, and reactive to light  Neck:      Thyroid: No thyromegaly  Vascular: No JVD  Cardiovascular:      Rate and Rhythm: Normal rate and regular rhythm  Pulses: Normal pulses  Heart sounds: Normal heart sounds  No murmur heard  Pulmonary:      Effort: Pulmonary effort is normal  No respiratory distress  Breath sounds: Normal breath sounds  No stridor  No wheezing, rhonchi or rales  Abdominal:      General: Bowel sounds are normal  There is no distension  Palpations: Abdomen is soft  There is no mass  Tenderness: There is no abdominal tenderness  There is no guarding or rebound  Genitourinary:     Prostate: Normal       Rectum: Normal       Comments: BPH on KARLIE  Musculoskeletal:         General: No tenderness or deformity  Normal range of motion  Cervical back: Normal range of motion and neck supple  Right lower leg: No edema  Left lower leg: No edema  Lymphadenopathy:      Cervical: No cervical adenopathy  Skin:     General: Skin is warm and dry  Findings: No erythema or rash  Neurological:      Mental Status: He is alert and oriented to person, place, and time  Mental status is at baseline  Deep Tendon Reflexes: Reflexes are normal and symmetric  Psychiatric:         Mood and Affect: Mood normal          Behavior: Behavior normal          Thought Content: Thought content normal          Judgment: Judgment normal            This note was completed in part utilizing [employer] direct voice recognition software  Grammatical errors, random word insertion, spelling mistakes, and incomplete sentences may be an occasional consequence of the system secondary to software limitations, ambient noise and hardware issues  At the time of dictation, efforts were made to edit, clarify and /or correct errors    Please read the chart carefully and recognize, using context, where substitutions have occurred  If you have any questions or concerns about the context, text or information contained within the body of this dictation, please contact myself, the provider, for further clarification

## 2022-11-01 NOTE — PROGRESS NOTES
Name: Shayne Jalloh      : 1960      MRN: [id number]  Encounter Provider: Dano Bustamante DO  Encounter Date: 2022   Encounter department: [place] [address]    1  Elevated PSA  -     CBC and differential; Future; Expected date: 2023  -     Comprehensive metabolic panel; Future; Expected date: 2023  -     PSA, total and free; Future; Expected date: 2023    2  Hyperlipidemia, unspecified hyperlipidemia type  -     CBC and differential; Future; Expected date: 2023  -     Comprehensive metabolic panel; Future; Expected date: 2023  -     Lipid Panel with Direct LDL reflex; Future; Expected date: 2023    3  Hypertension, unspecified type  -     CBC and differential; Future; Expected date: 2023  -     Comprehensive metabolic panel; Future; Expected date: 2023    4  Bilateral hearing loss due to cerumen impaction  -     CBC and differential; Future; Expected date: 2023  -     Comprehensive metabolic panel; Future; Expected date: 2023      Subjective    HPI     Objective    /84   Pulse 67   Resp 15   Ht 5' 11" (1 803 m)   Wt 87 5 kg (193 lb)   SpO2 97%   BMI 26 92 kg/m²      Ear cerumen removal    Date/Time: 2022 6:39 PM  Performed by: Dano Bustamante DO  Authorized by: Dano Bustamante DO   Universal Protocol:  Procedure performed by:  Consent: Verbal consent obtained  Written consent not obtained  Risks and benefits: risks, benefits and alternatives were discussed  Consent given by: patient  Time out: Immediately prior to procedure a "time out" was called to verify the correct patient, procedure, equipment, support staff and site/side marked as required    Patient understanding: patient states understanding of the procedure being performed  Patient consent: the patient's understanding of the procedure matches consent given  Procedure consent: procedure consent matches procedure scheduled  Patient identity confirmed: verbally with patient and provided demographic data      Patient location:  Clinic  Procedure details:     Location:  L ear and R ear    Procedure type: irrigation with instrumentation      Instrumentation: curette      Approach:  Internal, external and natural orifice  Post-procedure details:     Complication:  None    Hearing quality:  Improved    Patient tolerance of procedure:   Tolerated well, no immediate complications      Ric Valenzuela DO

## 2023-01-23 DIAGNOSIS — R97.20 ELEVATED PSA: ICD-10-CM

## 2023-01-23 DIAGNOSIS — E78.5 HYPERLIPIDEMIA, UNSPECIFIED HYPERLIPIDEMIA TYPE: ICD-10-CM

## 2023-01-23 DIAGNOSIS — I10 HYPERTENSION, UNSPECIFIED TYPE: ICD-10-CM

## 2023-01-23 RX ORDER — ROSUVASTATIN CALCIUM 5 MG/1
TABLET, COATED ORAL
Qty: 90 TABLET | Refills: 3 | Status: SHIPPED | OUTPATIENT
Start: 2023-01-23

## 2023-03-02 DIAGNOSIS — E78.5 HYPERLIPIDEMIA, UNSPECIFIED HYPERLIPIDEMIA TYPE: ICD-10-CM

## 2023-03-02 DIAGNOSIS — R97.20 ELEVATED PSA: ICD-10-CM

## 2023-03-02 DIAGNOSIS — I10 HYPERTENSION, UNSPECIFIED TYPE: ICD-10-CM

## 2023-03-02 RX ORDER — AMLODIPINE BESYLATE 5 MG/1
5 TABLET ORAL DAILY
Qty: 90 TABLET | Refills: 3 | Status: SHIPPED | OUTPATIENT
Start: 2023-03-02

## 2023-03-03 RX ORDER — ENALAPRIL MALEATE 10 MG/1
10 TABLET ORAL 2 TIMES DAILY
Qty: 180 TABLET | Refills: 3 | Status: SHIPPED | OUTPATIENT
Start: 2023-03-03

## 2023-05-08 ENCOUNTER — OFFICE VISIT (OUTPATIENT)
Dept: INTERNAL MEDICINE CLINIC | Facility: CLINIC | Age: 63
End: 2023-05-08

## 2023-05-08 VITALS
BODY MASS INDEX: 28.14 KG/M2 | RESPIRATION RATE: 15 BRPM | OXYGEN SATURATION: 98 % | DIASTOLIC BLOOD PRESSURE: 80 MMHG | TEMPERATURE: 98.2 F | HEART RATE: 80 BPM | HEIGHT: 71 IN | SYSTOLIC BLOOD PRESSURE: 124 MMHG | WEIGHT: 201 LBS

## 2023-05-08 DIAGNOSIS — D35.02 ADRENAL ADENOMA, LEFT: ICD-10-CM

## 2023-05-08 DIAGNOSIS — I10 HYPERTENSION, UNSPECIFIED TYPE: ICD-10-CM

## 2023-05-08 DIAGNOSIS — Z23 ENCOUNTER FOR IMMUNIZATION: ICD-10-CM

## 2023-05-08 DIAGNOSIS — E78.5 HYPERLIPIDEMIA, UNSPECIFIED HYPERLIPIDEMIA TYPE: Primary | ICD-10-CM

## 2023-05-08 DIAGNOSIS — E55.9 VITAMIN D DEFICIENCY: ICD-10-CM

## 2023-05-08 NOTE — PROGRESS NOTES
Assessment/Plan:    #HLD  -LDL 85 HDL 45 triglyceride 191  -has not been eating healthy and will cut back  -continue crestor, omega 3  -exercise by riding bike, walking dog, and dirt bike riding  -atorvastatin, pravastatin caused transaminitis and not effective  -on aspirin twice a week  -brother with MI in his 46s  -due to warmer weather is now using exercise bike more     #Cerumen Impaction  -in b/l ears and disimpacted previously  -encouraged to use hydrogen peroxide once a week and he has been doing it intermittently    #Vitamin D Deficiency  -low at 23  -now on 2000 units vitamin D daily    #Elevated PSA  -PSA up to 6 5 at one time  -now down to   91 and stable  -will continue to trend  -possibly elevated due to gallbladder inflammation and removal at that time  -KARLIE previously with BPH but stable otherwise  -rides his dirt bike often and recently got a [employer] bike which may elevated PSA as well     #Esophagitis  -EGD in 2019 with grade 2 esophagitis and stricture at junction and underwent dilation  -was on pantoprazole but stopped and doing well with diet control and taking pantoprazole prn but stable     #Left Adrenal Adenoma  -due for CT adrenal gland protocol and will order at this time     #HTN  -BP without issues on amlodipine, enalapril    #Erectile Dysfunction  -remains on viagra prn    #Health Maintenance  -routine labs and followup 6 months  -covid and shingrix up to date  -covid bivalent pending  -PNA 20 today  -flu vaccine up to date  -colonoscopy due 2028  -works at [employer] and plans to retire in July 2023, will focus on woodworking and dirt biking and skiing and keep busy with side projects    No problem-specific Assessment & Plan notes found for this encounter  Diagnoses and all orders for this visit:    Hyperlipidemia, unspecified hyperlipidemia type  -     CBC and differential; Future  -     Comprehensive metabolic panel;  Future  -     Lipid Panel with Direct LDL reflex; Future    Hypertension, unspecified type  -     CBC and differential; Future  -     Comprehensive metabolic panel; Future    Vitamin D deficiency  -     CBC and differential; Future  -     Comprehensive metabolic panel; Future  -     Vitamin D 25 hydroxy; Future    Adrenal adenoma, left  -     CT abdomen pelvis w contrast; Future  -     CBC and differential; Future  -     Comprehensive metabolic panel; Future    Encounter for immunization  -     Pneumococcal Conjugate Vaccine 20-valent (Pcv20)            Current Outpatient Medications:   •  amLODIPine (NORVASC) 5 mg tablet, Take 1 tablet (5 mg total) by mouth daily, Disp: 90 tablet, Rfl: 3  •  aspirin 81 MG tablet, Take 81 mg by mouth 2 (two) times a week , Disp: , Rfl:   •  Cholecalciferol (Vitamin D) 50 MCG (2000 UT) tablet, Take 2,000 Units by mouth daily, Disp: , Rfl:   •  enalapril (VASOTEC) 10 mg tablet, Take 1 tablet (10 mg total) by mouth 2 (two) times a day, Disp: 180 tablet, Rfl: 3  •  Omega-3 Fatty Acids (FISH OIL) 1,000 mg, Take 1 capsule by mouth 2 (two) times a day  , Disp: , Rfl:   •  rosuvastatin (CRESTOR) 5 mg tablet, TAKE 1 TABLET DAILY, Disp: 90 tablet, Rfl: 3  •  sildenafil (VIAGRA) 100 mg tablet, Take 1/2 tab one hour before sex, Disp: 10 tablet, Rfl: 0    Subjective:      Patient ID: Lorenza Conway is a 70 y o  male  HPI     Presents for a routine checkup  Denies any recent hospitalizations or surgery  Has been exercising and staying active  Is looking forward to longterm in July  Will continue to keep busy with hobbies and also working part time with [employer] and projects  LDL 85 HDL 45 triglycerides 191 and elevated due to diet  Will change this  Continue crestor  PSA   91 and stable without problems  Is due for PNA 20 today and received that  Will need to repeat CT adrenal protocal due to left adrenal adenoma  BP stable today on amlodipine and enalapril  Will return to care in 6 months  Is on 2000 units vitamin D daily  "    The following portions of the patient's history were reviewed and updated as appropriate: allergies, current medications, past family history, past medical history, past social history, past surgical history and problem list     Review of Systems   Constitutional: Negative for activity change, appetite change, fatigue and fever  HENT: Negative for congestion, ear pain, hearing loss, sore throat and tinnitus  Eyes: Negative for photophobia, pain and visual disturbance  Respiratory: Negative for cough, shortness of breath and wheezing  Cardiovascular: Negative for chest pain, palpitations and leg swelling  Gastrointestinal: Negative for abdominal distention, abdominal pain, constipation, diarrhea, nausea and vomiting  Genitourinary: Negative for difficulty urinating, frequency and hematuria  Musculoskeletal: Negative for arthralgias, back pain, gait problem, joint swelling, myalgias, neck pain and neck stiffness  Skin: Negative for color change, pallor, rash and wound  Neurological: Negative for dizziness, tremors, numbness and headaches  Hematological: Does not bruise/bleed easily  Objective:      /80   Pulse 80   Temp 98 2 °F (36 8 °C)   Resp 15   Ht 5' 11\" (1 803 m)   Wt 91 2 kg (201 lb)   SpO2 98%   BMI 28 03 kg/m²          Physical Exam  Vitals reviewed  Constitutional:       Appearance: Normal appearance  He is well-developed  HENT:      Head: Normocephalic and atraumatic  Right Ear: Tympanic membrane, ear canal and external ear normal  There is no impacted cerumen  Left Ear: Tympanic membrane, ear canal and external ear normal  There is no impacted cerumen  Nose: Nose normal       Mouth/Throat:      Pharynx: Oropharynx is clear  No oropharyngeal exudate or posterior oropharyngeal erythema  Eyes:      Conjunctiva/sclera: Conjunctivae normal       Pupils: Pupils are equal, round, and reactive to light  Neck:      Thyroid: No thyromegaly        " Vascular: No JVD  Cardiovascular:      Rate and Rhythm: Normal rate and regular rhythm  Pulses: Normal pulses  Heart sounds: Normal heart sounds  No murmur heard  Pulmonary:      Effort: Pulmonary effort is normal  No respiratory distress  Breath sounds: Normal breath sounds  No stridor  No wheezing, rhonchi or rales  Abdominal:      General: Bowel sounds are normal  There is no distension  Palpations: Abdomen is soft  There is no mass  Tenderness: There is no abdominal tenderness  There is no right CVA tenderness, left CVA tenderness, guarding or rebound  Musculoskeletal:         General: No tenderness or deformity  Normal range of motion  Cervical back: Normal range of motion and neck supple  Right lower leg: No edema  Left lower leg: No edema  Lymphadenopathy:      Cervical: No cervical adenopathy  Skin:     General: Skin is warm and dry  Findings: No erythema or rash  Neurological:      Mental Status: He is alert and oriented to person, place, and time  Deep Tendon Reflexes: Reflexes are normal and symmetric  This note was completed in part utilizing [employer] direct voice recognition software  Grammatical errors, random word insertion, spelling mistakes, and incomplete sentences may be an occasional consequence of the system secondary to software limitations, ambient noise and hardware issues  At the time of dictation, efforts were made to edit, clarify and /or correct errors  Please read the chart carefully and recognize, using context, where substitutions have occurred  If you have any questions or concerns about the context, text or information contained within the body of this dictation, please contact myself, the provider, for further clarification

## 2023-05-22 ENCOUNTER — HOSPITAL ENCOUNTER (OUTPATIENT)
Dept: CT IMAGING | Facility: HOSPITAL | Age: 63
Discharge: HOME | End: 2023-05-22

## 2023-05-22 DIAGNOSIS — D35.02 ADRENAL ADENOMA, LEFT: ICD-10-CM

## 2023-09-05 ENCOUNTER — TELEPHONE (OUTPATIENT)
Dept: INTERNAL MEDICINE CLINIC | Facility: CLINIC | Age: 63
End: 2023-09-05

## 2023-09-05 DIAGNOSIS — K22.2 ESOPHAGEAL STRICTURE: Primary | ICD-10-CM

## 2023-09-05 NOTE — TELEPHONE ENCOUNTER
Hi, my name is Michelle De La Torre. My number is [phone]. I need to talk to you about a I'm having trouble swallowing my food again and I need to get my esophagus stretched out. So I don't know if I go through you or if I go through the doctor that does that. So if you could give me a call back, I would appreciate it. Thank you.  Diogo.

## 2023-09-27 DIAGNOSIS — E78.5 HYPERLIPIDEMIA, UNSPECIFIED HYPERLIPIDEMIA TYPE: ICD-10-CM

## 2023-09-27 DIAGNOSIS — I10 HYPERTENSION, UNSPECIFIED TYPE: ICD-10-CM

## 2023-09-27 DIAGNOSIS — R97.20 ELEVATED PSA: ICD-10-CM

## 2023-09-27 RX ORDER — ENALAPRIL MALEATE 10 MG/1
10 TABLET ORAL 2 TIMES DAILY
Qty: 180 TABLET | Refills: 3 | Status: SHIPPED | OUTPATIENT
Start: 2023-09-27

## 2023-09-27 RX ORDER — AMLODIPINE BESYLATE 5 MG/1
5 TABLET ORAL DAILY
Qty: 90 TABLET | Refills: 3 | Status: SHIPPED | OUTPATIENT
Start: 2023-09-27

## 2023-10-26 ENCOUNTER — TELEPHONE (OUTPATIENT)
Dept: FAMILY MEDICINE CLINIC | Facility: CLINIC | Age: 63
End: 2023-10-26

## 2023-10-26 DIAGNOSIS — Z91.89 RISK OF EXPOSURE TO LYME DISEASE: Primary | ICD-10-CM

## 2023-10-26 RX ORDER — DOXYCYCLINE HYCLATE 100 MG
100 TABLET ORAL 2 TIMES DAILY
Qty: 28 TABLET | Refills: 0 | Status: SHIPPED | OUTPATIENT
Start: 2023-10-26 | End: 2023-11-09

## 2023-10-26 NOTE — TELEPHONE ENCOUNTER
PATIENT SPOTTED A TICK EMBEDDED ON HIS R SIDE OF BACK HE WAS OUT IN THE WOODS LAST NIGHT BETWEEN 3 AND 5PM.  WIFE COULD SEE JUST A TINY SPOT ON BACK, I PLACE THE SEALED BAG WITH LIVE TICK ON YOUR DESK . HE IS SCHEDULED TO SEE YOU 11/09.   PLEASE ADVISE

## 2023-10-26 NOTE — TELEPHONE ENCOUNTER
The head on the tick is still attached which is good news. The tick likely did not bite him but he should start on doxycycline as a precaution. He should wear bug spray whenever he is out in the woods.

## 2023-11-09 ENCOUNTER — OFFICE VISIT (OUTPATIENT)
Dept: INTERNAL MEDICINE CLINIC | Facility: CLINIC | Age: 63
End: 2023-11-09
Payer: MEDICARE

## 2023-11-09 VITALS
DIASTOLIC BLOOD PRESSURE: 70 MMHG | RESPIRATION RATE: 15 BRPM | OXYGEN SATURATION: 97 % | HEIGHT: 71 IN | BODY MASS INDEX: 26.18 KG/M2 | HEART RATE: 66 BPM | SYSTOLIC BLOOD PRESSURE: 130 MMHG | WEIGHT: 187 LBS

## 2023-11-09 DIAGNOSIS — N40.0 BENIGN PROSTATIC HYPERPLASIA WITHOUT LOWER URINARY TRACT SYMPTOMS: ICD-10-CM

## 2023-11-09 DIAGNOSIS — E78.5 HYPERLIPIDEMIA, UNSPECIFIED HYPERLIPIDEMIA TYPE: ICD-10-CM

## 2023-11-09 DIAGNOSIS — Z23 ENCOUNTER FOR IMMUNIZATION: ICD-10-CM

## 2023-11-09 DIAGNOSIS — D35.02 ADRENAL ADENOMA, LEFT: ICD-10-CM

## 2023-11-09 DIAGNOSIS — N20.0 LEFT RENAL STONE: Primary | ICD-10-CM

## 2023-11-09 DIAGNOSIS — I10 HYPERTENSION, UNSPECIFIED TYPE: ICD-10-CM

## 2023-11-09 PROCEDURE — G0008 ADMIN INFLUENZA VIRUS VAC: HCPCS | Performed by: INTERNAL MEDICINE

## 2023-11-09 PROCEDURE — 90662 IIV NO PRSV INCREASED AG IM: CPT | Performed by: INTERNAL MEDICINE

## 2023-11-09 PROCEDURE — 99214 OFFICE O/P EST MOD 30 MIN: CPT | Performed by: INTERNAL MEDICINE

## 2023-11-09 RX ORDER — OMEPRAZOLE 40 MG/1
40 CAPSULE, DELAYED RELEASE ORAL DAILY
COMMUNITY
Start: 2023-09-28

## 2024-02-05 DIAGNOSIS — I10 HYPERTENSION, UNSPECIFIED TYPE: ICD-10-CM

## 2024-02-05 DIAGNOSIS — R97.20 ELEVATED PSA: ICD-10-CM

## 2024-02-05 DIAGNOSIS — E78.5 HYPERLIPIDEMIA, UNSPECIFIED HYPERLIPIDEMIA TYPE: ICD-10-CM

## 2024-02-05 RX ORDER — AMLODIPINE BESYLATE 5 MG/1
5 TABLET ORAL DAILY
Qty: 90 TABLET | Refills: 3 | Status: SHIPPED | OUTPATIENT
Start: 2024-02-05

## 2024-02-05 NOTE — TELEPHONE ENCOUNTER
Message was left  Refill needed   Pharm [facility]  When I called the number her gave [facility] was [facility]     [phone]

## 2024-04-12 DIAGNOSIS — E78.5 HYPERLIPIDEMIA, UNSPECIFIED HYPERLIPIDEMIA TYPE: ICD-10-CM

## 2024-04-12 DIAGNOSIS — R97.20 ELEVATED PSA: ICD-10-CM

## 2024-04-12 DIAGNOSIS — I10 HYPERTENSION, UNSPECIFIED TYPE: ICD-10-CM

## 2024-04-12 RX ORDER — ROSUVASTATIN CALCIUM 5 MG/1
5 TABLET, COATED ORAL DAILY
Qty: 90 TABLET | Refills: 3 | Status: SHIPPED | OUTPATIENT
Start: 2024-04-12

## 2024-04-12 RX ORDER — ENALAPRIL MALEATE 10 MG/1
10 TABLET ORAL 2 TIMES DAILY
Qty: 180 TABLET | Refills: 3 | Status: SHIPPED | OUTPATIENT
Start: 2024-04-12

## 2024-04-15 DIAGNOSIS — R97.20 ELEVATED PSA: ICD-10-CM

## 2024-04-15 DIAGNOSIS — I10 HYPERTENSION, UNSPECIFIED TYPE: ICD-10-CM

## 2024-04-15 DIAGNOSIS — E78.5 HYPERLIPIDEMIA, UNSPECIFIED HYPERLIPIDEMIA TYPE: ICD-10-CM

## 2024-04-15 RX ORDER — ROSUVASTATIN CALCIUM 5 MG/1
5 TABLET, COATED ORAL DAILY
Qty: 90 TABLET | Refills: 3 | Status: SHIPPED | OUTPATIENT
Start: 2024-04-15

## 2024-04-15 NOTE — TELEPHONE ENCOUNTER
Hi, my name is Killian Mo. I need a prescription up to [facility] for Crestor. Tabs is generic, it's 5 milligrams and I need a 90 day supply. If you have any questions, you could reach me at [phone]. Thank you. Diogo.

## 2024-07-23 ENCOUNTER — OFFICE VISIT (OUTPATIENT)
Dept: INTERNAL MEDICINE CLINIC | Facility: CLINIC | Age: 64
End: 2024-07-23
Payer: MEDICARE

## 2024-07-23 VITALS
SYSTOLIC BLOOD PRESSURE: 124 MMHG | DIASTOLIC BLOOD PRESSURE: 78 MMHG | OXYGEN SATURATION: 98 % | TEMPERATURE: 98 F | WEIGHT: 184 LBS | BODY MASS INDEX: 25.66 KG/M2 | HEART RATE: 78 BPM

## 2024-07-23 DIAGNOSIS — I10 PRIMARY HYPERTENSION: ICD-10-CM

## 2024-07-23 DIAGNOSIS — Z23 NEED FOR TDAP VACCINATION: ICD-10-CM

## 2024-07-23 DIAGNOSIS — Z79.899 OTHER LONG TERM (CURRENT) DRUG THERAPY: ICD-10-CM

## 2024-07-23 DIAGNOSIS — K21.00 GASTROESOPHAGEAL REFLUX DISEASE WITH ESOPHAGITIS WITHOUT HEMORRHAGE: Primary | ICD-10-CM

## 2024-07-23 DIAGNOSIS — E78.2 MIXED HYPERLIPIDEMIA: ICD-10-CM

## 2024-07-23 DIAGNOSIS — Z87.19 STATUS POST DILATION OF ESOPHAGEAL NARROWING: ICD-10-CM

## 2024-07-23 DIAGNOSIS — N52.8 OTHER MALE ERECTILE DYSFUNCTION: ICD-10-CM

## 2024-07-23 DIAGNOSIS — Z98.890 STATUS POST DILATION OF ESOPHAGEAL NARROWING: ICD-10-CM

## 2024-07-23 PROBLEM — K81.9 CHOLECYSTITIS: Status: RESOLVED | Noted: 2019-02-02 | Resolved: 2024-07-23

## 2024-07-23 PROBLEM — R13.10 DYSPHAGIA: Chronic | Status: RESOLVED | Noted: 2019-01-23 | Resolved: 2024-07-23

## 2024-07-23 PROCEDURE — 99214 OFFICE O/P EST MOD 30 MIN: CPT | Performed by: INTERNAL MEDICINE

## 2024-07-23 PROCEDURE — G2211 COMPLEX E/M VISIT ADD ON: HCPCS | Performed by: INTERNAL MEDICINE

## 2024-07-23 NOTE — PROGRESS NOTES
Ambulatory Visit  Name: Orestes Danielson      : 1960      MRN: [id number]  Encounter Provider: Thomas Wall DO  Encounter Date: 2024   Encounter department: [facility]    Assessment & Plan   1. Gastroesophageal reflux disease with esophagitis without hemorrhage  Comments:  taking PPI and seeing GI for ongoing care.  Orders:  -     Comprehensive metabolic panel; Future; Expected date: 01/10/2025  -     CBC and differential; Future; Expected date: 01/10/2025  2. Status post dilation of esophageal narrowing  Comments:  twice in the past.  Taking PPI and seeing GI for ongoing care  3. Mixed hyperlipidemia  Comments:  taking statin and no SE, c/w rx  Orders:  -     Comprehensive metabolic panel; Future; Expected date: 01/10/2025  -     Lipid Panel with Direct LDL reflex; Future; Expected date: 01/10/2025  -     Stress test only, exercise; Future; Expected date: 2024  4. Other long term (current) drug therapy  Comments:  treating HTN & hyperlipidemia but has premature fhx of CAD.  check stress test to r/o ACS.  he is aware to check his out of pocket cost for test when scheduling  Orders:  -     Stress test only, exercise; Future; Expected date: 2024  5. Other male erectile dysfunction  Comments:  takes viagra prn and no SE, c/w rx  6. Primary hypertension  Comments:  takes ACEI and no SE, c/w rx  Orders:  -     Comprehensive metabolic panel; Future; Expected date: 01/10/2025  -     CBC and differential; Future; Expected date: 01/10/2025  -     Stress test only, exercise; Future; Expected date: 2024  7. Need for Tdap vaccination  Comments:  rx provided to complete this at pharmacy  Orders:  -     tetanus-diphtheria-acellular pertussis (ADACEL) 5-2-15.5 LF-mcg/0.5 injection; Inject 0.5 mL into a muscle once for 1 dose      Depression Screening and Follow-up Plan: Patient was screened for depression during today's encounter. They screened negative with a PHQ-2  score of 0.        History of Present Illness     HPI    New to me, here to establish care.  Used to see Dr Morrell as PCP in past.  Reviewed meds and briefly reviewed past med hx with patient.  He is overall doing well and staying active with walking 2-3 miles each day.  He completed BW Prior to today's appt.  He has a fhx of premature CAD and used to get regular stress tests in past with PCP.  He is due for Tdap vaccine.  He had EGD with dilation twice in past and sees Dr Agarwal from GI for care.  He enjoys skiing and riding motorbikes/motorcycles on trails.  ROS Otherwise negative, no other complaints.    Review of Systems   Constitutional:  Negative for fever.   HENT:  Negative for congestion.    Eyes:  Negative for visual disturbance.   Respiratory:  Negative for cough.    Cardiovascular:  Negative for chest pain.   Gastrointestinal:  Negative for abdominal pain.   Endocrine: Negative for polyuria.   Genitourinary:  Negative for difficulty urinating.   Musculoskeletal:  Negative for gait problem.   Skin:  Negative for rash.   Allergic/Immunologic: Negative for immunocompromised state.   Neurological:  Negative for dizziness.   Psychiatric/Behavioral:  Negative for dysphoric mood.      Past Medical History:   Diagnosis Date    Allergic     Elevated PSA     Hyperlipidemia      Past Surgical History:   Procedure Laterality Date    CHOLANGIOGRAM N/A 2/2/2019    Procedure: CHOLANGIOGRAM;  Surgeon: Abel Swartz DO;  Location: [facility];  Service: General    CHOLECYSTECTOMY LAPAROSCOPIC N/A 2/2/2019    Procedure: CHOLECYSTECTOMY LAPAROSCOPIC with IOC;  Surgeon: Abel Swartz DO;  Location: [facility];  Service: General     Family History   Problem Relation Age of Onset    Hypertension Mother     Heart disease Father 66        CARDIAC DISORDER    Hypertension Father     Heart attack Brother 57    Cancer Brother 56        bile duct cancer     Social History     Tobacco Use    Smoking status: Never    Smokeless tobacco:  Never   Vaping Use    Vaping status: Never Used   Substance and Sexual Activity    Alcohol use: Yes     Alcohol/week: 2.0 standard drinks of alcohol     Types: 2 Cans of beer per week     Comment: SOCIAL     Drug use: No    Sexual activity: Yes     Partners: Female     Current Outpatient Medications on File Prior to Visit   Medication Sig    amLODIPine (NORVASC) 5 mg tablet Take 1 tablet (5 mg total) by mouth daily    aspirin 81 MG tablet Take 81 mg by mouth 2 (two) times a week     Cholecalciferol (Vitamin D) 50 MCG (2000 UT) tablet Take 2,000 Units by mouth daily    enalapril (VASOTEC) 10 mg tablet Take 1 tablet (10 mg total) by mouth 2 (two) times a day    Omega-3 Fatty Acids (FISH OIL) 1,000 mg Take 1 capsule by mouth 2 (two) times a day      omeprazole (PriLOSEC) 40 MG capsule Take 40 mg by mouth daily    rosuvastatin (CRESTOR) 5 mg tablet Take 1 tablet (5 mg total) by mouth daily    sildenafil (VIAGRA) 100 mg tablet Take 1/2 tab one hour before sex     No Known Allergies  Immunization History   Administered Date(s) Administered    COVID-19 MODERNA VACC 0.5 ML IM 03/31/2021, 04/28/2021, 12/08/2021    INFLUENZA 01/22/2018, 10/01/2018, 10/26/2023    Influenza Quadrivalent Preservative Free 3 years and older IM 09/24/2014    Influenza Quadrivalent, 6-35 Months IM 01/22/2018    Influenza, high dose seasonal 0.7 mL 11/09/2023    Pneumococcal Conjugate Vaccine 20-valent (Pcv20), Polysace 05/08/2023    Tdap 09/24/2014    Zoster Vaccine Recombinant 12/08/2021, 02/28/2022     Objective     /78 (BP Location: Left arm, Patient Position: Sitting, Cuff Size: Standard)   Pulse 78   Temp 98 °F (36.7 °C)   Wt 83.5 kg (184 lb)   SpO2 98%   BMI 25.66 kg/m²     Physical Exam  Vitals reviewed.   Constitutional:       General: He is not in acute distress.     Appearance: Normal appearance.   HENT:      Head: Normocephalic and atraumatic.      Right Ear: Tympanic membrane normal.      Left Ear: Tympanic membrane normal.       Mouth/Throat:      Mouth: Mucous membranes are moist.   Eyes:      Conjunctiva/sclera: Conjunctivae normal.   Cardiovascular:      Rate and Rhythm: Normal rate and regular rhythm.      Heart sounds: No murmur heard.  Pulmonary:      Effort: Pulmonary effort is normal.      Breath sounds: No wheezing or rales.   Abdominal:      General: Abdomen is flat. Bowel sounds are normal.      Palpations: Abdomen is soft.      Tenderness: There is no abdominal tenderness.   Musculoskeletal:      Cervical back: Neck supple.      Right lower leg: No edema.      Left lower leg: No edema.   Neurological:      Mental Status: He is alert. Mental status is at baseline.   Psychiatric:         Mood and Affect: Mood normal.         Behavior: Behavior normal.

## 2024-08-12 ENCOUNTER — HOSPITAL ENCOUNTER (OUTPATIENT)
Dept: NON INVASIVE DIAGNOSTICS | Facility: HOSPITAL | Age: 64
Discharge: HOME | End: 2024-08-12
Payer: MEDICARE

## 2024-08-12 VITALS
WEIGHT: 184 LBS | SYSTOLIC BLOOD PRESSURE: 134 MMHG | OXYGEN SATURATION: 100 % | HEART RATE: 68 BPM | HEIGHT: 71 IN | BODY MASS INDEX: 25.76 KG/M2 | RESPIRATION RATE: 20 BRPM | DIASTOLIC BLOOD PRESSURE: 80 MMHG

## 2024-08-12 DIAGNOSIS — I10 PRIMARY HYPERTENSION: ICD-10-CM

## 2024-08-12 DIAGNOSIS — Z79.899 OTHER LONG TERM (CURRENT) DRUG THERAPY: ICD-10-CM

## 2024-08-12 DIAGNOSIS — E78.2 MIXED HYPERLIPIDEMIA: ICD-10-CM

## 2024-08-12 LAB
MAX HR PERCENT: 99 %
MAX HR: 153 BPM
RATE PRESSURE PRODUCT: NORMAL
SL CV STRESS RECOVERY BP: NORMAL MMHG
SL CV STRESS RECOVERY HR: 94 BPM
SL CV STRESS RECOVERY O2 SAT: 99 %
SL CV STRESS STAGE REACHED: 4
STRESS ANGINA INDEX: 0
STRESS BASELINE BP: NORMAL MMHG
STRESS BASELINE HR: 68 BPM
STRESS O2 SAT REST: 100 %
STRESS PEAK HR: 153 BPM
STRESS POST ESTIMATED WORKLOAD: 13.4 METS
STRESS POST EXERCISE DUR MIN: 12 MIN
STRESS POST EXERCISE DUR SEC: 1 SEC
STRESS POST O2 SAT PEAK: 99 %
STRESS POST PEAK BP: 192 MMHG

## 2024-08-12 PROCEDURE — 93016 CV STRESS TEST SUPVJ ONLY: CPT | Performed by: INTERNAL MEDICINE

## 2024-08-12 PROCEDURE — 93018 CV STRESS TEST I&R ONLY: CPT | Performed by: INTERNAL MEDICINE

## 2024-08-12 PROCEDURE — 93017 CV STRESS TEST TRACING ONLY: CPT

## 2024-08-13 LAB
ARRHY DURING EX: NORMAL
CHEST PAIN STATEMENT: NORMAL
MAX DIASTOLIC BP: 90 MMHG
MAX PREDICTED HEART RATE: 154 BPM
PROTOCOL NAME: NORMAL
REASON FOR TERMINATION: NORMAL
STRESS POST EXERCISE DUR MIN: 12 MIN
STRESS POST EXERCISE DUR SEC: 1 SEC
STRESS POST PEAK HR: 153 BPM
STRESS POST PEAK SYSTOLIC BP: 192 MMHG
TARGET HR FORMULA: NORMAL
TEST INDICATION: NORMAL

## 2024-08-14 ENCOUNTER — TELEPHONE (OUTPATIENT)
Dept: FAMILY MEDICINE CLINIC | Facility: CLINIC | Age: 64
End: 2024-08-14

## 2024-08-14 NOTE — TELEPHONE ENCOUNTER
----- Message from Thomas Wall DO sent at 8/12/2024  3:00 PM EDT -----  Regarding: FW:  Please let the patient know his stress test result are back and good news, no signs of heart disease    Thank you  ----- Message -----  From: Álvaro Cleveland DO  Sent: 8/12/2024  11:56 AM EDT  To: Thomas Wall DO

## 2024-11-21 DIAGNOSIS — R97.20 ELEVATED PSA: ICD-10-CM

## 2024-11-21 DIAGNOSIS — I10 HYPERTENSION, UNSPECIFIED TYPE: ICD-10-CM

## 2024-11-21 DIAGNOSIS — E78.5 HYPERLIPIDEMIA, UNSPECIFIED HYPERLIPIDEMIA TYPE: ICD-10-CM

## 2024-11-21 RX ORDER — ENALAPRIL MALEATE 10 MG/1
10 TABLET ORAL 2 TIMES DAILY
Qty: 180 TABLET | Refills: 1 | Status: SHIPPED | OUTPATIENT
Start: 2024-11-21

## 2024-11-21 NOTE — TELEPHONE ENCOUNTER
Reason for call:   [x] Refill   [] Prior Auth  [] Other:     Office:   [x] PCP/Provider -   [] Specialty/Provider -     Medication: ENALAPRIL    Dose/Frequency: 10 MG    Quantity: 180    Pharmacy:   [facility] - [address] [address]  Phone: [phone]  Fax: [phone]  DEAN #: --     Does the patient have enough for 3 days?   [x] Yes   [] No - Send as HP to POD

## 2025-01-15 ENCOUNTER — RESULTS FOLLOW-UP (OUTPATIENT)
Dept: FAMILY MEDICINE CLINIC | Facility: CLINIC | Age: 65
End: 2025-01-15

## 2025-01-15 LAB
ALBUMIN SERPL-MCNC: 4.4 G/DL (ref 3.5–5.7)
ALP SERPL-CCNC: 56 U/L (ref 35–120)
ALT SERPL-CCNC: 39 U/L
ANION GAP SERPL CALCULATED.3IONS-SCNC: 8 MMOL/L (ref 3–11)
AST SERPL-CCNC: 26 U/L
BASOPHILS # BLD AUTO: 0 THOU/CMM (ref 0–0.1)
BASOPHILS NFR BLD AUTO: 1 %
BILIRUB SERPL-MCNC: 1 MG/DL (ref 0.2–1)
BUN SERPL-MCNC: 14 MG/DL (ref 7–28)
CALCIUM SERPL-MCNC: 9.3 MG/DL (ref 8.5–10.5)
CHLORIDE SERPL-SCNC: 103 MMOL/L (ref 100–109)
CHOLEST SERPL-MCNC: 160 MG/DL
CHOLEST/HDLC SERPL: 3.2 {RATIO}
CO2 SERPL-SCNC: 30 MMOL/L (ref 21–31)
CREAT SERPL-MCNC: 0.88 MG/DL (ref 0.53–1.3)
CYTOLOGY CMNT CVX/VAG CYTO-IMP: NORMAL
DIFFERENTIAL METHOD BLD: NORMAL
EOSINOPHIL # BLD AUTO: 0.1 THOU/CMM (ref 0–0.5)
EOSINOPHIL NFR BLD AUTO: 3 %
ERYTHROCYTE [DISTWIDTH] IN BLOOD BY AUTOMATED COUNT: 12.8 % (ref 12–16)
GFR/BSA.PRED SERPLBLD CYS-BASED-ARV: 94 ML/MIN/{1.73_M2}
GLUCOSE SERPL-MCNC: 88 MG/DL (ref 65–99)
HCT VFR BLD AUTO: 42.2 % (ref 37–48)
HDLC SERPL-MCNC: 50 MG/DL (ref 23–92)
HGB BLD-MCNC: 14.8 G/DL (ref 12.5–17)
LDLC SERPL CALC-MCNC: 62 MG/DL
LYMPHOCYTES # BLD AUTO: 1.4 THOU/CMM (ref 1–3)
LYMPHOCYTES NFR BLD AUTO: 27 %
MCH RBC QN AUTO: 31.5 PG (ref 27–36)
MCHC RBC AUTO-ENTMCNC: 35.1 G/DL (ref 32–37)
MCV RBC AUTO: 90 FL (ref 80–100)
MONOCYTES # BLD AUTO: 0.4 THOU/CMM (ref 0.3–1)
MONOCYTES NFR BLD AUTO: 8 %
NEUTROPHILS # BLD AUTO: 3.3 THOU/CMM (ref 1.8–7.8)
NEUTROPHILS NFR BLD AUTO: 61 %
NONHDLC SERPL-MCNC: 110 MG/DL
PLATELET # BLD AUTO: 284 THOU/CMM (ref 140–350)
PMV BLD REES-ECKER: 8 FL (ref 7.5–11.3)
POTASSIUM SERPL-SCNC: 3.9 MMOL/L (ref 3.5–5.2)
PROT SERPL-MCNC: 6.5 G/DL (ref 6.3–8.3)
RBC # BLD AUTO: 4.7 MILL/CMM (ref 4–5.4)
SODIUM SERPL-SCNC: 141 MMOL/L (ref 135–145)
TRIGL SERPL-MCNC: 239 MG/DL
WBC # BLD AUTO: 5.3 THOU/CMM (ref 4–10.5)

## 2025-01-23 ENCOUNTER — OFFICE VISIT (OUTPATIENT)
Dept: FAMILY MEDICINE CLINIC | Facility: CLINIC | Age: 65
End: 2025-01-23
Payer: MEDICARE

## 2025-01-23 VITALS
DIASTOLIC BLOOD PRESSURE: 83 MMHG | BODY MASS INDEX: 25.8 KG/M2 | WEIGHT: 185 LBS | HEART RATE: 70 BPM | SYSTOLIC BLOOD PRESSURE: 147 MMHG | OXYGEN SATURATION: 98 % | TEMPERATURE: 98 F

## 2025-01-23 DIAGNOSIS — I10 PRIMARY HYPERTENSION: ICD-10-CM

## 2025-01-23 DIAGNOSIS — N40.0 BENIGN PROSTATIC HYPERPLASIA, UNSPECIFIED WHETHER LOWER URINARY TRACT SYMPTOMS PRESENT: Chronic | ICD-10-CM

## 2025-01-23 DIAGNOSIS — E78.1 HYPERTRIGLYCERIDEMIA: ICD-10-CM

## 2025-01-23 DIAGNOSIS — R97.20 ELEVATED PSA: ICD-10-CM

## 2025-01-23 DIAGNOSIS — E78.2 MIXED HYPERLIPIDEMIA: ICD-10-CM

## 2025-01-23 DIAGNOSIS — Z00.00 ENCOUNTER FOR ANNUAL WELLNESS VISIT (AWV) IN MEDICARE PATIENT: Primary | ICD-10-CM

## 2025-01-23 DIAGNOSIS — K21.00 GASTROESOPHAGEAL REFLUX DISEASE WITH ESOPHAGITIS WITHOUT HEMORRHAGE: ICD-10-CM

## 2025-01-23 PROCEDURE — G0438 PPPS, INITIAL VISIT: HCPCS | Performed by: FAMILY MEDICINE

## 2025-01-23 NOTE — ASSESSMENT & PLAN NOTE
- BP of 147/83 in office today  - Patient continues on amlodipine 5 mg QD, enalapril 10 mg QD   - Does not check BP regularly    Plan  - Continue current medications  - Patient advised to check BP regularly and log numbers for review

## 2025-01-23 NOTE — ASSESSMENT & PLAN NOTE
- Patient denies any urinary issues at this time   - Reports night time urination of 1-2x per night   - Denies excessive straining, weakened urinary flow

## 2025-01-23 NOTE — ASSESSMENT & PLAN NOTE
- No acute complaints or concerns regarding  - Continues on omeprazole 40 mg   - Follows up with GI

## 2025-01-23 NOTE — PROGRESS NOTES
Name: Orestes Danielson      : 1960      MRN: [id number]  Encounter Provider: Kvng Becerra DO  Encounter Date: 2025   Encounter department: [facility]    Assessment & Plan  Encounter for annual wellness visit (AWV) in Medicare patient  - No acute complaints or concerns  - Denies any recent illness or change in health status        Primary hypertension  - BP of 147/83 in office today  - Patient continues on amlodipine 5 mg QD, enalapril 10 mg QD   - Does not check BP regularly    Plan  - Continue current medications  - Patient advised to check BP regularly and log numbers for review        Gastroesophageal reflux disease with esophagitis without hemorrhage  - No acute complaints or concerns regarding  - Continues on omeprazole 40 mg   - Follows up with GI        Benign prostatic hyperplasia, unspecified whether lower urinary tract symptoms present  - Patient denies any urinary issues at this time   - Reports night time urination of 1-2x per night   - Denies excessive straining, weakened urinary flow        Hypertriglyceridemia  - Notable elevation on trigs; 239  - Continues on rosuvastatin 5 mg QD, Omega-3 fatty acid 1,000 mg QD     Plan  - Repeat lipid panel  - Consideration for starting fibrate medication   Orders:    Lipid panel; Future       Preventive health issues were discussed with patient, and age appropriate screening tests were ordered as noted in patient's After Visit Summary. Personalized health advice and appropriate referrals for health education or preventive services given if needed, as noted in patient's After Visit Summary.    History of Present Illness     64 yoM PMHx of carotid artery stenosis, HTN, HLD, GERD, BPH, ED presents for AWV. Patient is without any acute complaints or concerns. Patient denies any recent illness or change in health status.        Patient Care Team:  Kvng Becerra DO as PCP - General (Family Medicine)  Sarmad  MD Sherron as PCP - PCP-[facility] Attributed-Roster    Review of Systems   Constitutional:  Negative for activity change, appetite change and fatigue.   HENT:  Negative for congestion, postnasal drip and rhinorrhea.    Respiratory:  Negative for cough and shortness of breath.    Cardiovascular:  Negative for chest pain and palpitations.   Gastrointestinal:  Negative for constipation and diarrhea.   Genitourinary:  Negative for decreased urine volume, difficulty urinating and frequency.   Musculoskeletal:  Negative for arthralgias, back pain and myalgias.   Skin:  Negative for color change and rash.   Neurological:  Negative for dizziness, light-headedness and headaches.     Medical History Reviewed by provider this encounter:  Tobacco  Allergies  Meds  Problems  Med Hx  Surg Hx  Fam Hx       Annual Wellness Visit Questionnaire   Orestes is here for his Subsequent Wellness visit.     Health Risk Assessment:   Patient rates overall health as good. Patient feels that their physical health rating is same. Patient is satisfied with their life. Eyesight was rated as same. Hearing was rated as same. Patient feels that their emotional and mental health rating is same. Patients states they are never, rarely angry. Patient states they are never, rarely unusually tired/fatigued. Pain experienced in the last 7 days has been none. Patient states that he has experienced no weight loss or gain in last 6 months.     Depression Screening:   PHQ-2 Score: 0      Fall Risk Screening:   In the past year, patient has experienced: no history of falling in past year      Home Safety:  Patient does not have trouble with stairs inside or outside of their home. Patient has working smoke alarms and has working carbon monoxide detector. Home safety hazards include: none.     Nutrition:   Current diet is Regular.     Medications:   Patient is currently taking over-the-counter supplements. OTC medications include: see medication  list. Patient is able to manage medications.     Activities of Daily Living (ADLs)/Instrumental Activities of Daily Living (IADLs):   Walk and transfer into and out of bed and chair?: Yes  Dress and groom yourself?: Yes    Bathe or shower yourself?: Yes    Feed yourself? Yes  Do your laundry/housekeeping?: Yes  Manage your money, pay your bills and track your expenses?: Yes  Make your own meals?: Yes    Do your own shopping?: Yes    Previous Hospitalizations:   Any hospitalizations or ED visits within the last 12 months?: No      Advance Care Planning:   Living will: Yes    Durable POA for healthcare: Yes    Advanced directive: Yes      PREVENTIVE SCREENINGS      Cardiovascular Screening:    General: Screening Not Indicated and History Lipid Disorder      Diabetes Screening:     General: Screening Current      Colorectal Cancer Screening:     General: Screening Current      Abdominal Aortic Aneurysm (AAA) Screening:    Risk factors include: age between 65-76 yo        Lung Cancer Screening:     General: Screening Not Indicated    Screening, Brief Intervention, and Referral to Treatment (SBIRT)    Screening      AUDIT-C Screenin) How often did you have a drink containing alcohol in the past year? monthly or less  3) How often did you have 6 or more drinks on one occasion in the past year? less than monthly    Single Item Drug Screening:  How often have you used an illegal drug (including marijuana) or a prescription medication for non-medical reasons in the past year? never    Single Item Drug Screen Score: 0  Interpretation: Negative screen for possible drug use disorder    Social Drivers of Health     Food Insecurity: No Food Insecurity (2025)    Hunger Vital Sign     Worried About Running Out of Food in the Last Year: Never true     Ran Out of Food in the Last Year: Never true   Transportation Needs: No Transportation Needs (2025)    PRAPARE - Transportation     Lack of Transportation (Medical): No      Lack of Transportation (Non-Medical): No   Housing Stability: Low Risk  (1/23/2025)    Housing Stability Vital Sign     Unable to Pay for Housing in the Last Year: No     Number of Times Moved in the Last Year: 1     Homeless in the Last Year: No   Utilities: Not At Risk (1/23/2025)    [facility] Utilities     Threatened with loss of utilities: No     No results found.    Objective   /83 (BP Location: Left arm, Patient Position: Sitting, Cuff Size: Standard)   Pulse 70   Temp 98 °F (36.7 °C)   Wt 83.9 kg (185 lb)   SpO2 98%   BMI 25.80 kg/m²     Physical Exam  Vitals and nursing note reviewed.   Constitutional:       General: He is not in acute distress.     Appearance: Normal appearance. He is well-developed. He is not ill-appearing.   HENT:      Head: Normocephalic and atraumatic.      Right Ear: External ear normal.      Left Ear: External ear normal.      Nose: Nose normal.   Eyes:      Conjunctiva/sclera: Conjunctivae normal.   Cardiovascular:      Rate and Rhythm: Normal rate and regular rhythm.      Pulses: Normal pulses.      Heart sounds: Normal heart sounds. No murmur heard.  Pulmonary:      Effort: Pulmonary effort is normal. No respiratory distress.      Breath sounds: Normal breath sounds.   Abdominal:      Palpations: Abdomen is soft.      Tenderness: There is no abdominal tenderness.   Musculoskeletal:         General: No swelling.      Cervical back: Neck supple.      Right lower leg: No edema.      Left lower leg: No edema.   Skin:     General: Skin is warm and dry.      Capillary Refill: Capillary refill takes less than 2 seconds.   Neurological:      Mental Status: He is alert.   Psychiatric:         Mood and Affect: Mood normal.

## 2025-01-29 NOTE — ASSESSMENT & PLAN NOTE
- Notable elevation on trigs; 239  - Continues on rosuvastatin 5 mg QD, Omega-3 fatty acid 1,000 mg QD     Plan  - Repeat lipid panel  - Consideration for starting fibrate medication   Orders:    Lipid panel; Future

## 2025-03-04 DIAGNOSIS — R97.20 ELEVATED PSA: ICD-10-CM

## 2025-03-04 DIAGNOSIS — I10 HYPERTENSION, UNSPECIFIED TYPE: ICD-10-CM

## 2025-03-04 DIAGNOSIS — E78.5 HYPERLIPIDEMIA, UNSPECIFIED HYPERLIPIDEMIA TYPE: ICD-10-CM

## 2025-03-04 DIAGNOSIS — K21.9 GASTROESOPHAGEAL REFLUX DISEASE, UNSPECIFIED WHETHER ESOPHAGITIS PRESENT: Primary | ICD-10-CM

## 2025-03-04 NOTE — TELEPHONE ENCOUNTER
Reason for call:   [x] Refill   [] Prior Auth  [] Other:     Office:   [x] PCP/Provider - Patient requesting all meds from PCP Rossy Becerra  [] Specialty/Provider -     Medication: amLODIPine (NORVASC) 5 mg   Dose/Frequency: 1 daily  Quantity: 90    enalapril (VASOTEC) 10 mg   1 BID, #180    omeprazole (PriLOSEC) 40 MG   1 daily,  #90    rosuvastatin (CRESTOR) 5 mg   1 daily, #90      [facility]    Does the patient have enough for 3 days?   [x] Yes   [] No - Send as HP to POD

## 2025-03-05 RX ORDER — OMEPRAZOLE 40 MG/1
40 CAPSULE, DELAYED RELEASE ORAL DAILY
Qty: 30 CAPSULE | Refills: 2 | Status: SHIPPED | OUTPATIENT
Start: 2025-03-05

## 2025-03-05 RX ORDER — ROSUVASTATIN CALCIUM 5 MG/1
5 TABLET, COATED ORAL DAILY
Qty: 90 TABLET | Refills: 0 | Status: SHIPPED | OUTPATIENT
Start: 2025-03-05

## 2025-03-05 RX ORDER — AMLODIPINE BESYLATE 5 MG/1
5 TABLET ORAL DAILY
Qty: 90 TABLET | Refills: 0 | Status: SHIPPED | OUTPATIENT
Start: 2025-03-05

## 2025-03-05 RX ORDER — ENALAPRIL MALEATE 10 MG/1
10 TABLET ORAL 2 TIMES DAILY
Qty: 180 TABLET | Refills: 0 | Status: SHIPPED | OUTPATIENT
Start: 2025-03-05

## 2025-06-27 DIAGNOSIS — E78.5 HYPERLIPIDEMIA, UNSPECIFIED HYPERLIPIDEMIA TYPE: ICD-10-CM

## 2025-06-27 DIAGNOSIS — R97.20 ELEVATED PSA: ICD-10-CM

## 2025-06-27 DIAGNOSIS — I10 HYPERTENSION, UNSPECIFIED TYPE: ICD-10-CM

## 2025-06-30 RX ORDER — ENALAPRIL MALEATE 10 MG/1
10 TABLET ORAL 2 TIMES DAILY
Qty: 180 TABLET | Refills: 0 | Status: SHIPPED | OUTPATIENT
Start: 2025-06-30

## 2025-06-30 RX ORDER — ROSUVASTATIN CALCIUM 5 MG/1
5 TABLET, COATED ORAL DAILY
Qty: 90 TABLET | Refills: 0 | Status: SHIPPED | OUTPATIENT
Start: 2025-06-30

## 2025-06-30 RX ORDER — AMLODIPINE BESYLATE 5 MG/1
5 TABLET ORAL DAILY
Qty: 90 TABLET | Refills: 0 | Status: SHIPPED | OUTPATIENT
Start: 2025-06-30

## 2025-07-18 LAB
CHOLEST SERPL-MCNC: 178 MG/DL
CHOLEST/HDLC SERPL: 3.2 {RATIO}
HDLC SERPL-MCNC: 56 MG/DL (ref 23–92)
LDLC SERPL CALC-MCNC: 91 MG/DL
NONHDLC SERPL-MCNC: 122 MG/DL
TRIGL SERPL-MCNC: 156 MG/DL

## 2025-07-24 ENCOUNTER — OFFICE VISIT (OUTPATIENT)
Dept: FAMILY MEDICINE CLINIC | Facility: CLINIC | Age: 65
End: 2025-07-24
Payer: MEDICARE

## 2025-07-24 VITALS
DIASTOLIC BLOOD PRESSURE: 74 MMHG | BODY MASS INDEX: 25.66 KG/M2 | WEIGHT: 184 LBS | TEMPERATURE: 98.5 F | RESPIRATION RATE: 18 BRPM | SYSTOLIC BLOOD PRESSURE: 136 MMHG | HEART RATE: 60 BPM | OXYGEN SATURATION: 98 %

## 2025-07-24 DIAGNOSIS — Z00.00 ENCOUNTER FOR HEALTH CHECK: Primary | ICD-10-CM

## 2025-07-24 DIAGNOSIS — R39.15 URGENCY OF URINATION: ICD-10-CM

## 2025-07-24 DIAGNOSIS — Z13.21 ENCOUNTER FOR VITAMIN DEFICIENCY SCREENING: ICD-10-CM

## 2025-07-24 DIAGNOSIS — E78.5 HYPERLIPIDEMIA, UNSPECIFIED HYPERLIPIDEMIA TYPE: ICD-10-CM

## 2025-07-24 DIAGNOSIS — I10 HYPERTENSION, UNSPECIFIED TYPE: ICD-10-CM

## 2025-07-24 DIAGNOSIS — K21.9 GASTROESOPHAGEAL REFLUX DISEASE, UNSPECIFIED WHETHER ESOPHAGITIS PRESENT: ICD-10-CM

## 2025-07-24 DIAGNOSIS — R97.20 ELEVATED PSA: ICD-10-CM

## 2025-07-24 DIAGNOSIS — Z82.49 FAMILY HISTORY OF PREMATURE CORONARY ARTERY DISEASE: ICD-10-CM

## 2025-07-24 DIAGNOSIS — N52.9 ERECTILE DYSFUNCTION, UNSPECIFIED ERECTILE DYSFUNCTION TYPE: ICD-10-CM

## 2025-07-24 DIAGNOSIS — Z12.5 SCREENING FOR PROSTATE CANCER: ICD-10-CM

## 2025-07-24 DIAGNOSIS — Z13.0 SCREENING, IRON DEFICIENCY ANEMIA: ICD-10-CM

## 2025-07-24 DIAGNOSIS — E78.1 HYPERTRIGLYCERIDEMIA: ICD-10-CM

## 2025-07-24 DIAGNOSIS — Z13.228 SCREENING FOR METABOLIC DISORDER: ICD-10-CM

## 2025-07-24 PROCEDURE — 99214 OFFICE O/P EST MOD 30 MIN: CPT | Performed by: FAMILY MEDICINE

## 2025-07-24 PROCEDURE — G2211 COMPLEX E/M VISIT ADD ON: HCPCS | Performed by: FAMILY MEDICINE

## 2025-07-24 RX ORDER — SILDENAFIL 100 MG/1
TABLET, FILM COATED ORAL
Qty: 10 TABLET | Refills: 0 | Status: SHIPPED | OUTPATIENT
Start: 2025-07-24

## 2025-07-24 RX ORDER — OMEPRAZOLE 40 MG/1
40 CAPSULE, DELAYED RELEASE ORAL DAILY
Qty: 90 CAPSULE | Refills: 2 | Status: SHIPPED | OUTPATIENT
Start: 2025-07-24

## 2025-07-24 RX ORDER — ENALAPRIL MALEATE 10 MG/1
10 TABLET ORAL 2 TIMES DAILY
Qty: 180 TABLET | Refills: 0 | Status: SHIPPED | OUTPATIENT
Start: 2025-07-24

## 2025-07-24 RX ORDER — AMLODIPINE BESYLATE 5 MG/1
5 TABLET ORAL DAILY
Qty: 90 TABLET | Refills: 0 | Status: SHIPPED | OUTPATIENT
Start: 2025-07-24

## 2025-07-24 RX ORDER — CHLORAL HYDRATE 500 MG
1000 CAPSULE ORAL 2 TIMES DAILY
Qty: 180 CAPSULE | Refills: 1 | Status: SHIPPED | OUTPATIENT
Start: 2025-07-24

## 2025-07-24 RX ORDER — CHOLECALCIFEROL (VITAMIN D3) 50 MCG
2000 TABLET ORAL DAILY
Qty: 90 TABLET | Refills: 1 | Status: SHIPPED | OUTPATIENT
Start: 2025-07-24 | End: 2025-07-24 | Stop reason: CLARIF

## 2025-07-24 RX ORDER — ROSUVASTATIN CALCIUM 5 MG/1
5 TABLET, COATED ORAL DAILY
Qty: 90 TABLET | Refills: 0 | Status: SHIPPED | OUTPATIENT
Start: 2025-07-24

## (undated) DEVICE — SYRINGE 20ML LL

## (undated) DEVICE — ENDOPATH XCEL BLADELESS TROCARS WITH STABILITY SLEEVES: Brand: ENDOPATH XCEL

## (undated) DEVICE — CAUTERY TIP POLISHER: Brand: DEVON

## (undated) DEVICE — SPECIMEN CONTAINER STERILE PEEL PACK

## (undated) DEVICE — CATHETER KUMAR

## (undated) DEVICE — JACKSON-PRATT 100CC BULB RESERVOIR: Brand: CARDINAL HEALTH

## (undated) DEVICE — TAUT CATH INTRODUCER 5 FR

## (undated) DEVICE — CHOLE CATH KIT ARROW

## (undated) DEVICE — Device: Brand: MEDEX

## (undated) DEVICE — PENCIL ELECTROSURG E-Z CLEAN -0035H

## (undated) DEVICE — GLOVE INDICATOR PI UNDERGLOVE SZ 7.5 BLUE

## (undated) DEVICE — DRAPE SHEET THREE QUARTER

## (undated) DEVICE — SUT MONOCRYL 4-0 PS-2 18 IN Y496G

## (undated) DEVICE — LIGAMAX 5 MM ENDOSCOPIC MULTIPLE CLIP APPLIER: Brand: LIGAMAX

## (undated) DEVICE — SKIN MARKER DUAL TIP WITH RULER CAP, FLEXIBLE RULER AND LABELS: Brand: DEVON

## (undated) DEVICE — PACK PBDS LAP CHOLE RF

## (undated) DEVICE — 3000CC GUARDIAN II: Brand: GUARDIAN

## (undated) DEVICE — STOPCOCK 3-WAY

## (undated) DEVICE — GLOVE SRG BIOGEL 7.5

## (undated) DEVICE — ENDOPATH XCEL UNIVERSAL TROCAR STABLILITY SLEEVES: Brand: ENDOPATH XCEL

## (undated) DEVICE — SURGICEL 2 X 3

## (undated) DEVICE — DRAPE C-ARM X-RAY

## (undated) DEVICE — ENDOPOUCH RETRIEVER SPECIMEN RETRIEVAL BAGS: Brand: ENDOPOUCH RETRIEVER

## (undated) DEVICE — 5 MM CURVED DISSECTORS WITH MONOPOLAR CAUTERY: Brand: ENDOPATH

## (undated) DEVICE — ADHESIVE SKN CLSR HISTOACRYL FLEX 0.5ML LF

## (undated) DEVICE — NEEDLE 25G X 1 1/2

## (undated) DEVICE — CHLORAPREP HI-LITE 26ML ORANGE

## (undated) DEVICE — INTENDED FOR TISSUE SEPARATION, AND OTHER PROCEDURES THAT REQUIRE A SHARP SURGICAL BLADE TO PUNCTURE OR CUT.: Brand: BARD-PARKER SAFETY BLADES SIZE 11, STERILE

## (undated) DEVICE — JP PERF DRN SIL FLT 10MM FULL: Brand: CARDINAL HEALTH

## (undated) DEVICE — SUT VICRYL 0 UR-6 27 IN J603H